# Patient Record
Sex: MALE | Race: WHITE | NOT HISPANIC OR LATINO | Employment: FULL TIME | URBAN - METROPOLITAN AREA
[De-identification: names, ages, dates, MRNs, and addresses within clinical notes are randomized per-mention and may not be internally consistent; named-entity substitution may affect disease eponyms.]

---

## 2020-12-31 ENCOUNTER — NURSE TRIAGE (OUTPATIENT)
Dept: NURSING | Facility: CLINIC | Age: 40
End: 2020-12-31

## 2020-12-31 ENCOUNTER — VIRTUAL VISIT (OUTPATIENT)
Dept: URGENT CARE | Facility: CLINIC | Age: 40
End: 2020-12-31
Payer: COMMERCIAL

## 2020-12-31 DIAGNOSIS — K04.7 DENTAL INFECTION: Primary | ICD-10-CM

## 2020-12-31 PROCEDURE — 99203 OFFICE O/P NEW LOW 30 MIN: CPT | Mod: TEL | Performed by: FAMILY MEDICINE

## 2020-12-31 RX ORDER — CLINDAMYCIN HCL 300 MG
300 CAPSULE ORAL 3 TIMES DAILY
Qty: 21 CAPSULE | Refills: 0 | Status: SHIPPED | OUTPATIENT
Start: 2020-12-31 | End: 2021-01-07

## 2020-12-31 NOTE — TELEPHONE ENCOUNTER
Triage Call:    Patient is requesting a virtual appointment.   Has a tooth that is broken, believe she may have an abscess. Gums towards the roof of mouth on left side there is a inflamed area.   Rates pain as moderate.   Denies any pus.   Tooth has been broken for several months.     Pt was advised of protocol recommendation/disposition of call dentist today.  Patient would like to speak to a provider about possibly getting antibiotics to start over the weekend.  RN discussed in person visit.   Patient declined going into the clinic, does not want to go in, requests only a phone visit to discuss.  Knows others who have been very sick with COVID and is nervous to be seen in person.    Patient requests a phone visit to discuss. RN recommended phone urgent care appointment as patient declined in person.    RN recommended patient still contact the dentist as well. Pt was agreeable to plan and was transferred to scheduling to make appointment.     Kristi Rdz RN 12/31/20 10:03 AM  [facility] Nurse Advisor        COVID 19 Nurse Triage Plan/Patient Instructions    Please be aware that novel coronavirus (COVID-19) may be circulating in the community. If you develop symptoms such as fever, cough, or SOB or if you have concerns about the presence of another infection including coronavirus (COVID-19), please contact your health care provider or visit [email or web address].     Disposition/Instructions    In-Person Visit with provider recommended. Reference Visit Selection Guide.    Thank you for taking steps to prevent the spread of this virus.  o Limit your contact with others.  o Wear a simple mask to cover your cough.  o Wash your hands well and often.    Resources    [facility]: About COVID-19: [email or web address]    CDC: What to Do If You're Sick: www.cdc.gov/coronavirus/2019-ncov/about/steps-when-sick.html    CDC: Ending Home Isolation:  www.cdc.gov/coronavirus/2019-ncov/hcp/disposition-in-home-patients.html     CDC: Caring for Someone: www.cdc.gov/coronavirus/2019-ncov/if-you-are-sick/care-for-someone.html     [facility]: Interim Guidance for Hospital Discharge to Home: www.health.[facility].[place].us/diseases/coronavirus/hcp/hospdischarge.pdf    [facility] clinical trials (COVID-19 research studies): clinicalaffairs.[facility]/umn-clinical-trials     Below are the COVID-19 hotlines at the [place] Department of Health ([facility]). Interpreters are available.   o For health questions: Call 987-172-2854 or 1-344.159.1248 (7 a.m. to 7 p.m.)  o For questions about schools and childcare: Call 215-593-5159 or 1-775.968.5863 (7 a.m. to 7 p.m.)                   Additional Information    Negative: Pale cold skin and very weak (can't stand)    Negative: Similar pain previously and it was from 'heart attack'    Negative: Similar pain previously and it was from 'angina' and not relieved by nitroglycerin    Negative: Sounds like a life-threatening emergency to the triager    Negative: Chest pain    Toothache followed tooth injury    Negative: Knocked out (unconscious) > 1 minute    Negative: Difficult to awaken or acting confused (e.g., disoriented, slurred speech)    Negative: SEVERE neck pain (e.g., excruciating)    Negative: Sounds like a life-threatening emergency to the triager    Chipped tooth (piece missing)    Negative: Wound looks infected    Negative: Tooth knocked out    Negative: Tooth is almost falling out    Negative: Bleeding won't stop after 10 minutes of direct pressure (using correct technique)    Negative: Sounds like a serious injury to the triager    Negative: SEVERE pain (e.g., excruciating)    Negative: Patient sounds very sick or weak to the triager    Negative: Face is swollen    Negative: Fever    Negative: SEVERE toothache pain    Toothache present > 24 hours    Red or yellow lump present at the gum line of the painful tooth    Protocols used:  TOOTHACHE-A-OH, TOOTH INJURY-A-OH

## 2020-12-31 NOTE — PROGRESS NOTES
"The patient has been notified of following:     \"This telephone or video visit will be conducted via a call between you and your physician/provider. We have found that certain health care needs can be provided without the need for a physical exam.  This service lets us provide the care you need with a short phone conversation.  If a prescription is necessary we can send it directly to your pharmacy.  If lab work is needed we can place an order for that and you can then stop by our lab to have the test done at a later time.    Telephone or video visits are billed at different rates depending on your insurance coverage. During this emergency period, for some insurers they may be billed the same as an in-person visit.  Please reach out to your insurance provider with any questions.    Patient has given verbal consent for Telephone or video visit?  Yes  Subjective   HPI    Has a broken tooth - patient thinks it may have had a root canal in the past   Patient thinks he might have an abscess  Has noticed a lump and pain in the root of this tooth -   Not bleeding  Top left molar   Swelling started yesterday morning  Swallowing ok    Per patient swelling is mostly mild and located at the gums   Denies facial swelling   No neck swelling     Patient Active Problem List   Diagnosis     CARDIOVASCULAR SCREENING; LDL GOAL LESS THAN 160     Past Surgical History:   Procedure Laterality Date     SURGICAL HISTORY OF -   1987    Rt Inguinal Hernia Repair       Social History     Tobacco Use     Smoking status: Current Every Day Smoker     Packs/day: 1.00     Types: Cigarettes     Smokeless tobacco: Never Used   Substance Use Topics     Alcohol use: Yes     Alcohol/week: 7.0 standard drinks     Types: 7 Standard drinks or equivalent per week     Family History   Problem Relation Age of Onset     Family History Negative Unknown            Reviewed and updated as needed this visit by Provider   Allergies  Meds              Review of " Systems   Constitutional, HEENT, cardiovascular, pulmonary, GI, , musculoskeletal, neuro, skin, endocrine and psych systems are negative, except as otherwise noted.       Objective   Reported vitals:  There were no vitals taken for this visit.   healthy, alert and no distress  PSYCH: Alert and oriented times 3; coherent speech, normal   rate and volume, able to articulate logical thoughts, able   to abstract reason, no tangential thoughts, no hallucinations   or delusions  His affect is normal  RESP: No cough, no audible wheezing, able to talk in full sentences  Additional exam:  none  Remainder of exam unable to be completed due to telephone visits    Diagnostic Test Results:  Labs reviewed in Epic  none         Assessment/Plan:      ICD-10-CM    1. Dental infection  K04.7 clindamycin (CLEOCIN) 300 MG capsule     Prescribed with clindamycin. Aware of risk of c.diff with clindamycin. Aware to stop antibiotic immediately and come in to be seen if develop any diarrheal reaction. Alternative therapies were also offered and discussed  Recommend dental evaluation as soon as possible - preferably within 3 days  Alarm signs or symptoms discussed, if present recommend go to ER   If swelling gets worse or swelling in the neck or under the eye, trismus, difficulty swallowing go to ER       call duration:  13 minutes  Video: no    Kayla Briceno MD

## 2022-05-28 ENCOUNTER — HOSPITAL ENCOUNTER (EMERGENCY)
Facility: CLINIC | Age: 42
Discharge: HOME | End: 2022-05-28
Attending: EMERGENCY MEDICINE | Admitting: EMERGENCY MEDICINE
Payer: COMMERCIAL

## 2022-05-28 VITALS
HEART RATE: 104 BPM | HEIGHT: 74 IN | DIASTOLIC BLOOD PRESSURE: 86 MMHG | SYSTOLIC BLOOD PRESSURE: 146 MMHG | WEIGHT: 150 LBS | BODY MASS INDEX: 19.25 KG/M2 | TEMPERATURE: 98.5 F | RESPIRATION RATE: 14 BRPM | OXYGEN SATURATION: 96 %

## 2022-05-28 DIAGNOSIS — R17 ELEVATED BILIRUBIN: ICD-10-CM

## 2022-05-28 DIAGNOSIS — F10.20 ALCOHOLISM (H): ICD-10-CM

## 2022-05-28 LAB
ALBUMIN SERPL-MCNC: 4.1 G/DL (ref 3.4–5)
ALP SERPL-CCNC: 85 U/L (ref 40–150)
ALT SERPL W P-5'-P-CCNC: 66 U/L (ref 0–70)
ANION GAP SERPL CALCULATED.3IONS-SCNC: 11 MMOL/L (ref 3–14)
AST SERPL W P-5'-P-CCNC: 124 U/L (ref 0–45)
BASOPHILS # BLD AUTO: 0.1 10E3/UL (ref 0–0.2)
BASOPHILS NFR BLD AUTO: 1 %
BILIRUB SERPL-MCNC: 4.8 MG/DL (ref 0.2–1.3)
BUN SERPL-MCNC: 10 MG/DL (ref 7–30)
CALCIUM SERPL-MCNC: 9.3 MG/DL (ref 8.5–10.1)
CHLORIDE BLD-SCNC: 97 MMOL/L (ref 94–109)
CO2 SERPL-SCNC: 26 MMOL/L (ref 20–32)
CREAT SERPL-MCNC: 0.96 MG/DL (ref 0.66–1.25)
EOSINOPHIL # BLD AUTO: 0.1 10E3/UL (ref 0–0.7)
EOSINOPHIL NFR BLD AUTO: 1 %
ERYTHROCYTE [DISTWIDTH] IN BLOOD BY AUTOMATED COUNT: 11.9 % (ref 10–15)
ETHANOL SERPL-MCNC: 0.1 G/DL
GFR SERPL CREATININE-BSD FRML MDRD: >90 ML/MIN/1.73M2
GLUCOSE BLD-MCNC: 107 MG/DL (ref 70–99)
HCT VFR BLD AUTO: 47 % (ref 40–53)
HGB BLD-MCNC: 16.6 G/DL (ref 13.3–17.7)
HOLD SPECIMEN: NORMAL
IMM GRANULOCYTES # BLD: 0 10E3/UL
IMM GRANULOCYTES NFR BLD: 0 %
LYMPHOCYTES # BLD AUTO: 1 10E3/UL (ref 0.8–5.3)
LYMPHOCYTES NFR BLD AUTO: 16 %
MCH RBC QN AUTO: 36 PG (ref 26.5–33)
MCHC RBC AUTO-ENTMCNC: 35.3 G/DL (ref 31.5–36.5)
MCV RBC AUTO: 102 FL (ref 78–100)
MONOCYTES # BLD AUTO: 0.7 10E3/UL (ref 0–1.3)
MONOCYTES NFR BLD AUTO: 12 %
NEUTROPHILS # BLD AUTO: 4.3 10E3/UL (ref 1.6–8.3)
NEUTROPHILS NFR BLD AUTO: 70 %
NRBC # BLD AUTO: 0 10E3/UL
NRBC BLD AUTO-RTO: 0 /100
PLATELET # BLD AUTO: 228 10E3/UL (ref 150–450)
POTASSIUM BLD-SCNC: 3.6 MMOL/L (ref 3.4–5.3)
PROT SERPL-MCNC: 7.4 G/DL (ref 6.8–8.8)
RBC # BLD AUTO: 4.61 10E6/UL (ref 4.4–5.9)
SODIUM SERPL-SCNC: 134 MMOL/L (ref 133–144)
WBC # BLD AUTO: 6.1 10E3/UL (ref 4–11)

## 2022-05-28 PROCEDURE — 96374 THER/PROPH/DIAG INJ IV PUSH: CPT

## 2022-05-28 PROCEDURE — 36415 COLL VENOUS BLD VENIPUNCTURE: CPT | Performed by: EMERGENCY MEDICINE

## 2022-05-28 PROCEDURE — 80053 COMPREHEN METABOLIC PANEL: CPT | Performed by: EMERGENCY MEDICINE

## 2022-05-28 PROCEDURE — 250N000013 HC RX MED GY IP 250 OP 250 PS 637: Performed by: EMERGENCY MEDICINE

## 2022-05-28 PROCEDURE — 96361 HYDRATE IV INFUSION ADD-ON: CPT

## 2022-05-28 PROCEDURE — 99285 EMERGENCY DEPT VISIT HI MDM: CPT | Mod: 25

## 2022-05-28 PROCEDURE — 82077 ASSAY SPEC XCP UR&BREATH IA: CPT | Performed by: EMERGENCY MEDICINE

## 2022-05-28 PROCEDURE — 85025 COMPLETE CBC W/AUTO DIFF WBC: CPT | Performed by: EMERGENCY MEDICINE

## 2022-05-28 PROCEDURE — 250N000011 HC RX IP 250 OP 636: Performed by: EMERGENCY MEDICINE

## 2022-05-28 PROCEDURE — 258N000003 HC RX IP 258 OP 636: Performed by: EMERGENCY MEDICINE

## 2022-05-28 RX ORDER — DIAZEPAM 10 MG/2ML
10 INJECTION, SOLUTION INTRAMUSCULAR; INTRAVENOUS ONCE
Status: COMPLETED | OUTPATIENT
Start: 2022-05-28 | End: 2022-05-28

## 2022-05-28 RX ORDER — DIAZEPAM 5 MG
10 TABLET ORAL ONCE
Status: COMPLETED | OUTPATIENT
Start: 2022-05-28 | End: 2022-05-28

## 2022-05-28 RX ADMIN — DIAZEPAM 10 MG: 5 INJECTION, SOLUTION INTRAMUSCULAR; INTRAVENOUS at 13:57

## 2022-05-28 RX ADMIN — DIAZEPAM 10 MG: 5 TABLET ORAL at 16:03

## 2022-05-28 RX ADMIN — SODIUM CHLORIDE 1000 ML: 9 INJECTION, SOLUTION INTRAVENOUS at 13:57

## 2022-05-28 RX ADMIN — SODIUM CHLORIDE 1000 ML: 9 INJECTION, SOLUTION INTRAVENOUS at 15:02

## 2022-05-28 NOTE — ED NOTES
Approximately 20 minute discussion with mom and patient about patient declining detox help. Mother is aware that she could decline to take patient home but she decided to take him and noted that patients father has struggled with alcoholism. Mother believes that if she only knew he was struggling that she could have prevented this situation from happening.     Patient requested an over-the-counter anti-anxiety medication. Advised patient that he could seek the help of a mental health practitioner for medications.    RN advised patient to go to detox. Patient states that he wants to stay with his mom so that he can go to his job on Tuesday. States that if he doesn't show up, he will be fired.

## 2022-05-28 NOTE — ED PROVIDER NOTES
"  History   Chief Complaint:  \"I've been on a ruvalcaba\"     The history is provided by the patient.   History supplemented by electronic chart review and mother at bedside    Jonh Ritter is a 41 year old male who presents with his mother for alcohol problems. The patient provides that he usually drinks 0.5 L of alcohol a day, and he has for a long time, and he has been binging even more for the last 4 days. His last drink was at 0700 today and he has had slight  tremors, nausea, and nonbloody vomiting. He comes to the ED for help with his alcohol use and possibly seeking out detox. He denies any history of alcohol withdrawal seizures and he has not sought treatment before.  No new pain.  No diarrhea.  No fevers.  No trouble breathing.  He has never gone through treatment or detox before.    Review of Systems   All other systems reviewed and are negative.    Allergies:   Penicillins     Medications:  The patient denies any current medications     Past Medical History:     Psoriasis     Past Surgical History:    Right Inguinal Hernia Repair     Family History:    The patient denies past family history.     Social History:  Patient came from home.  Patient is accompanied in the ED by his mother.  Patient drinks 0.5 liters of alcohol per day.  Works at an [employer].    Physical Exam     Patient Vitals for the past 24 hrs:   BP Temp Pulse Resp SpO2 Height Weight   05/28/22 1600 (!) 146/86 -- 104 14 96 % -- --   05/28/22 1447 -- -- 94 17 98 % -- --   05/28/22 1406 132/79 -- 102 20 96 % -- --   05/28/22 1313 (!) 145/86 98.5  F (36.9  C) (!) 130 20 98 % 1.88 m (6' 2\") 68 kg (150 lb)     Physical Exam  General: Male sitting upright in room 16, mother bedside  HENT: mucous membranes moist, OP clear  Eyes: PERRL without nystagmus  CV: extremities well perfused, regular rhythm  Resp: normal effort, no stridor, no cough observed  GI: abdomen soft and nontender, no guarding  MSK: no bony tenderness   Skin: appropriately " warm and dry  Neuro: alert, clear speech, oriented, normal tone in extremities, ambulation independent, slightly tremulous initially  Psych:  cooperative, denies feeling suicidal, no evidence of hallucinations    Emergency Department Course     Laboratory:  Labs Ordered and Resulted from Time of ED Arrival to Time of ED Departure   COMPREHENSIVE METABOLIC PANEL - Abnormal       Result Value    Sodium 134      Potassium 3.6      Chloride 97      Carbon Dioxide (CO2) 26      Anion Gap 11      Urea Nitrogen 10      Creatinine 0.96      Calcium 9.3      Glucose 107 (*)     Alkaline Phosphatase 85       (*)     ALT 66      Protein Total 7.4      Albumin 4.1      Bilirubin Total 4.8 (*)     GFR Estimate >90     ETHYL ALCOHOL LEVEL - Abnormal    Alcohol ethyl 0.10 (*)    CBC WITH PLATELETS AND DIFFERENTIAL - Abnormal    WBC Count 6.1      RBC Count 4.61      Hemoglobin 16.6      Hematocrit 47.0       (*)     MCH 36.0 (*)     MCHC 35.3      RDW 11.9      Platelet Count 228      % Neutrophils 70      % Lymphocytes 16      % Monocytes 12      % Eosinophils 1      % Basophils 1      % Immature Granulocytes 0      NRBCs per 100 WBC 0      Absolute Neutrophils 4.3      Absolute Lymphocytes 1.0      Absolute Monocytes 0.7      Absolute Eosinophils 0.1      Absolute Basophils 0.1      Absolute Immature Granulocytes 0.0      Absolute NRBCs 0.0        Emergency Department Course:     Reviewed:  I reviewed nursing notes, vitals, past medical history and Care Everywhere    Assessments:  1324 I obtained history and examined the patient as noted above.   1424 I rechecked the patient and explained findings.   1532 The patients nurse noted they would like to go to detox.    Interventions:  1357 NS 1000 mL IV  1357 Valium 10 mg IV  1502 NS 1000 mL IV  Valium 10mg PO    Disposition:  The patient was discharged    Impression & Plan   Medical Decision Making:  He presents for evaluation and treatment of his excess alcohol use.   He had initial slight tremors and tachycardia suggestive of early developing withdrawal and was given benzodiazepines with significant improvement.  I had several extended discussions with the patient and his mother at bedside regarding the risks of his alcoholism and treatment options, urging him to seek detox treatment given risk of worsening alcohol withdrawal, seizures, potentially even death.  At one point he did agree to go to detox, the bed was secured for him at [address] [place] and EMS was called, though then he changed his mind again and wished to go home instead.  Tachycardia resolved.  He does not manifest any altered mental status at this time, and is certainly not intoxicated despite the detectable alcohol level.  He was given printed resources to facilitate pursuit of alcohol treatment programs at a later time, and he is encouraged to return here for reevaluation at any hour.  He has elevated bilirubin but no abdominal pain or tenderness to suggest an acutely serious hepatobiliary disease.  He would benefit from alcohol cessation and close primary care follow-up as well.  Discharged home in improved condition.    Diagnosis:    ICD-10-CM    1. Alcoholism (H)  F10.20    2. Elevated bilirubin  R17        Scribe Disclosure:  I, Geovany Bowers, am serving as a scribe at 1:45 PM on 5/28/2022 to document services personally performed by Tomas Botello MD based on my observations and the provider's statements to me.     Scribe Disclosure:  I, Kash Arriaza, am serving as a scribe at 2:26 PM on 5/28/2022 to document services personally performed by Tomas Botello MD based on my observations and the provider's statements to me.             Tomas Botello MD  05/28/22 8712

## 2022-05-28 NOTE — ED TRIAGE NOTES
Pt drinking heaving for the past 4 days. Last drink at 7am today. Pt alert and oriented. tremors     Triage Assessment     Row Name 05/28/22 1314       Triage Assessment (Adult)    Airway WDL WDL       Respiratory WDL    Respiratory WDL WDL       Skin Circulation/Temperature WDL    Skin Circulation/Temperature WDL WDL       Cardiac WDL    Cardiac WDL WDL

## 2022-05-28 NOTE — ED NOTES
Pt resting on bed. States he feeling better from IV fluids and valium. Pt given lotion for psoriasis.

## 2022-05-28 NOTE — ED NOTES
Patient's mother came out to staff and said that patient has decided to go home instead of detox. RN spent time discussing this choice and potential outcome with patient and his mother.

## 2022-05-29 PROBLEM — F10.10 ALCOHOL ABUSE: Status: ACTIVE | Noted: 2022-05-29

## 2022-06-01 ENCOUNTER — VIRTUAL VISIT (OUTPATIENT)
Dept: INTERNAL MEDICINE | Facility: CLINIC | Age: 42
End: 2022-06-01
Payer: COMMERCIAL

## 2022-06-01 DIAGNOSIS — L40.9 PSORIASIS: ICD-10-CM

## 2022-06-01 DIAGNOSIS — R19.7 DIARRHEA, UNSPECIFIED TYPE: ICD-10-CM

## 2022-06-01 DIAGNOSIS — F10.10 ALCOHOL ABUSE: Primary | ICD-10-CM

## 2022-06-01 PROCEDURE — 99213 OFFICE O/P EST LOW 20 MIN: CPT | Mod: TEL | Performed by: INTERNAL MEDICINE

## 2022-06-01 RX ORDER — TRIAMCINOLONE ACETONIDE 1 MG/G
CREAM TOPICAL
Qty: 240 G | Refills: 3 | Status: SHIPPED | OUTPATIENT
Start: 2022-06-01 | End: 2024-04-12

## 2022-06-01 NOTE — Clinical Note
Please call to schedule face-to-face visit with clinician, could be internal medicine or family medicine, physician or nurse practitioner.  I signed a letter for work which is in my outbox, ask him how he wants that sent to him, for example scanned and emailed

## 2022-06-01 NOTE — PROGRESS NOTES
Jonh is a 41 year old who is being evaluated via a billable telephone visit.      What phone number would you like to be contacted at?  [phone]  How would you like to obtain your AVS? Mail a copy      Subjective   Jonh is a 41 year old who presents for the following health issues    Telephone visit because of alcohol use disorder, and following up after emergency department visit May 28, 2022    Last week alcohol ruvalcaba to point of blacking out and having basically no memory of several days  Friday May 27 he sent a messages to coworker and his mom (he does not remember actually sending)  Police came to his front door  We went to his Mom's house to detox  Had withdrawal and anxiety    [facility]-- diazepam and IV fluids  5-   Alcohol ethyl <=0.01 g/dL 0.10 High       AST 0 - 45 U/L 124 High      He opted out of detox.    He works as  of a   We missed work yesterday May 31  Went back to work today Jun 1, 2022    Today was first day, he did not have an eye-opener    Having diarrhea, probably as a withdrawal phenomenon  For diarrhea, recommended that he use over-the-counter Imodium, and also eat a bland diet, small more frequent meals.  Avoid caffeine and lactose.  The diarrhea is quite distressing, and he thinks that we will keep him from working this week    I wrote him a letter for work, recommending that he not work the remainder of this week, but could return on Monday, June 6    Alcoholism he admits since age 35  He is very afraid of losing his job  A friend will be taking him to an AA meeting, this weekend    Anxiety disorder, which probably drives alcohol problem  He asked about getting antianxiety medication, but I went to have a face-to-face visit with clinician first  We will have the scheduling team work on this    Psoriasis, he requests renewal of his topical triamcinolone    Phone call duration: 13 minutes

## 2022-06-02 ENCOUNTER — TELEPHONE (OUTPATIENT)
Dept: INTERNAL MEDICINE | Facility: CLINIC | Age: 42
End: 2022-06-02
Payer: COMMERCIAL

## 2022-06-02 NOTE — TELEPHONE ENCOUNTER
Pt called back and stated he has still not received the note that Dr Garcia wrote for him yesterday. Pt would like the letter sent via email @ [email or web address] or via [employer]. PT created a [employer] account today and said the letter could be sent via his [employer] account.    Alena Mejia

## 2022-06-02 NOTE — TELEPHONE ENCOUNTER
Received signed work excuse letter from Dr Garcia's outbox. Contacted pt to see how he wanted to receive the note. Pt requested the letter be emailed to him, so that was done- verified address and sent to email address on file: [email or web address]    MW 6/2/22

## 2022-06-11 ENCOUNTER — HEALTH MAINTENANCE LETTER (OUTPATIENT)
Age: 42
End: 2022-06-11

## 2022-09-03 PROCEDURE — 96361 HYDRATE IV INFUSION ADD-ON: CPT

## 2022-09-03 PROCEDURE — 96375 TX/PRO/DX INJ NEW DRUG ADDON: CPT

## 2022-09-03 PROCEDURE — 96376 TX/PRO/DX INJ SAME DRUG ADON: CPT

## 2022-09-03 PROCEDURE — 96374 THER/PROPH/DIAG INJ IV PUSH: CPT

## 2022-09-03 PROCEDURE — 99285 EMERGENCY DEPT VISIT HI MDM: CPT | Mod: 25

## 2022-09-04 ENCOUNTER — HOSPITAL ENCOUNTER (EMERGENCY)
Facility: CLINIC | Age: 42
Discharge: HOME | End: 2022-09-04
Attending: EMERGENCY MEDICINE | Admitting: EMERGENCY MEDICINE
Payer: COMMERCIAL

## 2022-09-04 VITALS
OXYGEN SATURATION: 96 % | BODY MASS INDEX: 19.88 KG/M2 | HEART RATE: 110 BPM | DIASTOLIC BLOOD PRESSURE: 59 MMHG | RESPIRATION RATE: 18 BRPM | SYSTOLIC BLOOD PRESSURE: 101 MMHG | WEIGHT: 150 LBS | TEMPERATURE: 97.5 F | HEIGHT: 73 IN

## 2022-09-04 DIAGNOSIS — F10.930 ALCOHOL WITHDRAWAL SYNDROME WITHOUT COMPLICATION (H): ICD-10-CM

## 2022-09-04 LAB
ALBUMIN SERPL-MCNC: 4.5 G/DL (ref 3.4–5)
ALP SERPL-CCNC: 87 U/L (ref 40–150)
ALT SERPL W P-5'-P-CCNC: 34 U/L (ref 0–70)
ANION GAP SERPL CALCULATED.3IONS-SCNC: 10 MMOL/L (ref 3–14)
AST SERPL W P-5'-P-CCNC: 33 U/L (ref 0–45)
BASOPHILS # BLD AUTO: 0.1 10E3/UL (ref 0–0.2)
BASOPHILS NFR BLD AUTO: 0 %
BILIRUB SERPL-MCNC: 2.4 MG/DL (ref 0.2–1.3)
BUN SERPL-MCNC: 8 MG/DL (ref 7–30)
CALCIUM SERPL-MCNC: 9.3 MG/DL (ref 8.5–10.1)
CHLORIDE BLD-SCNC: 102 MMOL/L (ref 94–109)
CO2 SERPL-SCNC: 28 MMOL/L (ref 20–32)
CREAT SERPL-MCNC: 0.92 MG/DL (ref 0.66–1.25)
EOSINOPHIL # BLD AUTO: 0 10E3/UL (ref 0–0.7)
EOSINOPHIL NFR BLD AUTO: 0 %
ERYTHROCYTE [DISTWIDTH] IN BLOOD BY AUTOMATED COUNT: 12.5 % (ref 10–15)
ETHANOL SERPL-MCNC: 0.13 G/DL
GFR SERPL CREATININE-BSD FRML MDRD: >90 ML/MIN/1.73M2
GLUCOSE BLD-MCNC: 109 MG/DL (ref 70–99)
HCT VFR BLD AUTO: 51.3 % (ref 40–53)
HGB BLD-MCNC: 18.1 G/DL (ref 13.3–17.7)
IMM GRANULOCYTES # BLD: 0 10E3/UL
IMM GRANULOCYTES NFR BLD: 0 %
LIPASE SERPL-CCNC: 95 U/L (ref 73–393)
LYMPHOCYTES # BLD AUTO: 2 10E3/UL (ref 0.8–5.3)
LYMPHOCYTES NFR BLD AUTO: 18 %
MAGNESIUM SERPL-MCNC: 2 MG/DL (ref 1.6–2.3)
MCH RBC QN AUTO: 35.1 PG (ref 26.5–33)
MCHC RBC AUTO-ENTMCNC: 35.3 G/DL (ref 31.5–36.5)
MCV RBC AUTO: 99 FL (ref 78–100)
MONOCYTES # BLD AUTO: 0.9 10E3/UL (ref 0–1.3)
MONOCYTES NFR BLD AUTO: 8 %
NEUTROPHILS # BLD AUTO: 8.3 10E3/UL (ref 1.6–8.3)
NEUTROPHILS NFR BLD AUTO: 74 %
NRBC # BLD AUTO: 0 10E3/UL
NRBC BLD AUTO-RTO: 0 /100
PLATELET # BLD AUTO: 295 10E3/UL (ref 150–450)
POTASSIUM BLD-SCNC: 3.6 MMOL/L (ref 3.4–5.3)
PROT SERPL-MCNC: 8.1 G/DL (ref 6.8–8.8)
RBC # BLD AUTO: 5.16 10E6/UL (ref 4.4–5.9)
SODIUM SERPL-SCNC: 140 MMOL/L (ref 133–144)
WBC # BLD AUTO: 11.4 10E3/UL (ref 4–11)

## 2022-09-04 PROCEDURE — 36415 COLL VENOUS BLD VENIPUNCTURE: CPT | Performed by: EMERGENCY MEDICINE

## 2022-09-04 PROCEDURE — 258N000003 HC RX IP 258 OP 636: Performed by: EMERGENCY MEDICINE

## 2022-09-04 PROCEDURE — 80053 COMPREHEN METABOLIC PANEL: CPT | Performed by: EMERGENCY MEDICINE

## 2022-09-04 PROCEDURE — 82077 ASSAY SPEC XCP UR&BREATH IA: CPT | Performed by: EMERGENCY MEDICINE

## 2022-09-04 PROCEDURE — 83690 ASSAY OF LIPASE: CPT | Performed by: EMERGENCY MEDICINE

## 2022-09-04 PROCEDURE — 85025 COMPLETE CBC W/AUTO DIFF WBC: CPT | Performed by: EMERGENCY MEDICINE

## 2022-09-04 PROCEDURE — 250N000013 HC RX MED GY IP 250 OP 250 PS 637: Performed by: EMERGENCY MEDICINE

## 2022-09-04 PROCEDURE — 250N000011 HC RX IP 250 OP 636: Performed by: EMERGENCY MEDICINE

## 2022-09-04 PROCEDURE — 83735 ASSAY OF MAGNESIUM: CPT | Performed by: EMERGENCY MEDICINE

## 2022-09-04 RX ORDER — CHLORDIAZEPOXIDE HYDROCHLORIDE 25 MG/1
25-50 CAPSULE, GELATIN COATED ORAL 3 TIMES DAILY PRN
Qty: 15 CAPSULE | Refills: 0 | Status: SHIPPED | OUTPATIENT
Start: 2022-09-04 | End: 2022-12-30

## 2022-09-04 RX ORDER — SODIUM CHLORIDE 9 MG/ML
INJECTION, SOLUTION INTRAVENOUS CONTINUOUS
Status: DISCONTINUED | OUTPATIENT
Start: 2022-09-04 | End: 2022-09-04 | Stop reason: HOSPADM

## 2022-09-04 RX ORDER — DIAZEPAM 10 MG/2ML
5 INJECTION, SOLUTION INTRAMUSCULAR; INTRAVENOUS ONCE
Status: COMPLETED | OUTPATIENT
Start: 2022-09-04 | End: 2022-09-04

## 2022-09-04 RX ORDER — ONDANSETRON 2 MG/ML
4 INJECTION INTRAMUSCULAR; INTRAVENOUS EVERY 30 MIN PRN
Status: DISCONTINUED | OUTPATIENT
Start: 2022-09-04 | End: 2022-09-04 | Stop reason: HOSPADM

## 2022-09-04 RX ORDER — ONDANSETRON 4 MG/1
4 TABLET, ORALLY DISINTEGRATING ORAL EVERY 8 HOURS PRN
Qty: 10 TABLET | Refills: 0 | Status: SHIPPED | OUTPATIENT
Start: 2022-09-04 | End: 2022-09-07

## 2022-09-04 RX ORDER — CHLORDIAZEPOXIDE HYDROCHLORIDE 25 MG/1
25 CAPSULE, GELATIN COATED ORAL ONCE
Status: DISCONTINUED | OUTPATIENT
Start: 2022-09-04 | End: 2022-09-04

## 2022-09-04 RX ORDER — DIAZEPAM 5 MG
5 TABLET ORAL ONCE
Status: COMPLETED | OUTPATIENT
Start: 2022-09-04 | End: 2022-09-04

## 2022-09-04 RX ADMIN — SODIUM CHLORIDE: 9 INJECTION, SOLUTION INTRAVENOUS at 06:28

## 2022-09-04 RX ADMIN — SODIUM CHLORIDE 1000 ML: 9 INJECTION, SOLUTION INTRAVENOUS at 04:41

## 2022-09-04 RX ADMIN — DIAZEPAM 5 MG: 5 INJECTION, SOLUTION INTRAMUSCULAR; INTRAVENOUS at 04:47

## 2022-09-04 RX ADMIN — DIAZEPAM 5 MG: 5 INJECTION, SOLUTION INTRAMUSCULAR; INTRAVENOUS at 06:27

## 2022-09-04 RX ADMIN — FAMOTIDINE 20 MG: 10 INJECTION INTRAVENOUS at 04:44

## 2022-09-04 RX ADMIN — ONDANSETRON 4 MG: 2 INJECTION INTRAMUSCULAR; INTRAVENOUS at 03:00

## 2022-09-04 RX ADMIN — DIAZEPAM 5 MG: 5 TABLET ORAL at 08:34

## 2022-09-04 RX ADMIN — SODIUM CHLORIDE 1000 ML: 9 INJECTION, SOLUTION INTRAVENOUS at 03:00

## 2022-09-04 ASSESSMENT — ENCOUNTER SYMPTOMS
VOMITING: 0
ABDOMINAL PAIN: 1
NAUSEA: 1
NERVOUS/ANXIOUS: 1
ABDOMINAL DISTENTION: 1
APPETITE CHANGE: 1

## 2022-09-04 ASSESSMENT — ACTIVITIES OF DAILY LIVING (ADL)
ADLS_ACUITY_SCORE: 35

## 2022-09-04 NOTE — ED PROVIDER NOTES
History   Chief Complaint:  Alcohol Problem and Withdrawal    The history is provided by the patient and a friend.      Jonh Ritter is a 41 year old male with history of alcohol abuse who presents with withdrawal symptoms. Jonh explains that he had his drinking under control for a while following his last episode of alcohol withdrawal three months ago. He adds that he recently had a ten day break from work and that he drank the first night thinking he'd have the rest of his break to get over his hangover. He reports that he had to continue drinking to avoid the hangover. He additionally reports nausea, decreased appetite, abdominal pain and distension, anxiety, and generalized tension in his body. Denies emesis. Jonh's friend, Alena, mentions that Jonh hasn't eaten in three days. He explains that he has excuses to drink alcohol such as alcohol being a sleep aid for him. Jonh shares that he is interested in inpatient treatment but is concerned that he would be fired from his job which he really enjoys. He reports that he has never been to detox nor has he considered outpatient treatment.     Review of Systems   Constitutional: Positive for appetite change.   Gastrointestinal: Positive for abdominal distention, abdominal pain and nausea. Negative for vomiting.   Psychiatric/Behavioral: The patient is nervous/anxious.    All other systems reviewed and are negative.    Allergies:   Penicillins      Medications:  Antacids     Past Medical History:     Psoriasis   Alcohol abuse      Past Surgical History:    Right Inguinal Hernia Repair      Family History:    The patient denies past family history.      Social History:  Patient came from home.  Lives alone.   Patient is accompanied in the ED by his friend, Alena.  Alcohol use: last drink tonight. Drinks frequently. Second episode of withdrawal.   PCP: Reid Garcia MD     Physical Exam     Patient Vitals for the past 24 hrs:   BP Temp Temp src Pulse Resp SpO2  "Height Weight   09/04/22 0500 116/78 -- -- 101 -- 96 % -- --   09/03/22 2218 (!) 167/90 97.5  F (36.4  C) Temporal 110 18 98 % 1.854 m (6' 1\") 68 kg (150 lb)     Physical Exam  General: Sitting up in bed  Eyes:  The pupils are equal and round    Conjunctivae and sclerae are normal  ENT:    Wearing a mask  Neck:  Normal range of motion  CV:  Tachycardic rate, regular rhythm     Skin warm and well perfused   Resp:  Non labored breathing on room air    No tachypnea    No cough heard    Lungs clear bilaterally  GI:  Abdomen is soft, there is no rigidity    No distension    No rebound tenderness     No abdominal tenderness  MS:  Bilateral hand tremors  Skin:  No rash or acute skin lesions noted  Neuro:   Awake, alert.      Speech is normal and fluent.    Face is symmetric.     Moves all extremities equally  Psych:  Anxiety    Emergency Department Course     Laboratory:  Labs Ordered and Resulted from Time of ED Arrival to Time of ED Departure   COMPREHENSIVE METABOLIC PANEL - Abnormal       Result Value    Sodium 140      Potassium 3.6      Chloride 102      Carbon Dioxide (CO2) 28      Anion Gap 10      Urea Nitrogen 8      Creatinine 0.92      Calcium 9.3      Glucose 109 (*)     Alkaline Phosphatase 87      AST 33      ALT 34      Protein Total 8.1      Albumin 4.5      Bilirubin Total 2.4 (*)     GFR Estimate >90     ETHYL ALCOHOL LEVEL - Abnormal    Alcohol ethyl 0.13 (*)    CBC WITH PLATELETS AND DIFFERENTIAL - Abnormal    WBC Count 11.4 (*)     RBC Count 5.16      Hemoglobin 18.1 (*)     Hematocrit 51.3      MCV 99      MCH 35.1 (*)     MCHC 35.3      RDW 12.5      Platelet Count 295      % Neutrophils 74      % Lymphocytes 18      % Monocytes 8      % Eosinophils 0      % Basophils 0      % Immature Granulocytes 0      NRBCs per 100 WBC 0      Absolute Neutrophils 8.3      Absolute Lymphocytes 2.0      Absolute Monocytes 0.9      Absolute Eosinophils 0.0      Absolute Basophils 0.1      Absolute Immature " Granulocytes 0.0      Absolute NRBCs 0.0     MAGNESIUM - Normal    Magnesium 2.0     LIPASE - Normal    Lipase 95       Emergency Department Course:       Reviewed:  I reviewed nursing notes, vitals, past medical history and Care Everywhere    Assessments:  0338 I obtained history and examined the patient as noted above.   0510 I rechecked the patient and explained findings.     Interventions:  Medications   0.9% sodium chloride BOLUS (0 mLs Intravenous Stopped 9/4/22 0441)     Followed by   sodium chloride 0.9% infusion (has no administration in time range)   ondansetron (ZOFRAN) injection 4 mg (4 mg Intravenous Given 9/4/22 0300)   diazepam (VALIUM) injection 5 mg (has no administration in time range)   diazepam (VALIUM) injection 5 mg (5 mg Intravenous Given 9/4/22 0447)   famotidine (PEPCID) injection 20 mg (20 mg Intravenous Given 9/4/22 0444)   0.9% sodium chloride BOLUS (1,000 mLs Intravenous New Bag 9/4/22 0441)     Disposition:  The patient was discharged to home.     Impression & Plan     Medical Decision Making:  Jonh Ritter is a 41-year-old male who presented to the emergency department with alcohol withdrawal.  Patient has been drinking heavily.  Wants to stop drinking.  Is supposed to go to work on Tuesday and reports that he needs to go to work then.  He cannot do an inpatient treatment program as he is afraid he will lose his job.  He declines detox.  Laboratory studies are overall unremarkable except for an elevated bilirubin which is slightly improved from a few months ago.  Has no focal tenderness and doubt biliary stone.  Likely from his alcohol use.  He was given Valium and had some improvement.  I discussed options with him and he wanted to go home.  He continues to decline detox.  I offered oral medications in the emergency department which he declined.  He would like to go home with medication.  His friend Alena is with him and will make sure that he does not drink alcohol while he is  taking this medication.  I discussed that he should not drive or work while taking this medication.  He also may consider just weaning himself off alcohol at home to help with alcohol withdrawal symptoms.     Diagnosis:    ICD-10-CM    1. Alcohol withdrawal syndrome without complication (H)  F10.230      Scribe Disclosure:  I, Lacey Lee, am serving as a scribe at 2:38 AM on 9/4/2022 to document services personally performed by No Luke MD based on my observations and the provider's statements to me.      No Luke MD  09/04/22 0818

## 2022-09-04 NOTE — ED TRIAGE NOTES
41-year-old male presents to the ED with complaints of alcohol withdrawal. Pt states he has been drinking heavily for the last week. And for the last three days he hasn't been feeling well so he felt he was going through withdrawals. Pt has been drinking ETOH to try to stop the withdrawals. Pt states last drink about 1 hour ago.      Triage Assessment     Row Name 09/03/22 [id number]       Triage Assessment (Adult)    Airway WDL WDL       Respiratory WDL    Respiratory WDL WDL       Skin Circulation/Temperature WDL    Skin Circulation/Temperature WDL WDL       Cardiac WDL    Cardiac WDL WDL       Peripheral/Neurovascular WDL    Peripheral Neurovascular WDL WDL       Cognitive/Neuro/Behavioral WDL    Cognitive/Neuro/Behavioral WDL WDL

## 2022-09-04 NOTE — DISCHARGE INSTRUCTIONS
Take librium for withdrawal, anxiety  But no drinking while taking this medication  Zofran for nausea  Take an antacid like omeprazole or pepcid daily. Can also use tums as neeeded

## 2022-10-16 ENCOUNTER — HEALTH MAINTENANCE LETTER (OUTPATIENT)
Age: 42
End: 2022-10-16

## 2022-10-30 ENCOUNTER — E-VISIT (OUTPATIENT)
Dept: URGENT CARE | Facility: CLINIC | Age: 42
End: 2022-10-30
Payer: COMMERCIAL

## 2022-10-30 DIAGNOSIS — R06.02 SOB (SHORTNESS OF BREATH): Primary | ICD-10-CM

## 2022-10-30 PROCEDURE — 99207 PR NON-BILLABLE SERV PER CHARTING: CPT | Performed by: INTERNAL MEDICINE

## 2022-10-30 NOTE — PATIENT INSTRUCTIONS
Dear Jonh Ritter,    We are sorry you are not feeling well. Based on the responses you provided, it is recommended that you be seen in-person in urgent care so we can better evaluate your symptoms as your shortness of breath is concerning. Please click here to find the nearest urgent care location to you.   You will not be charged for this Visit. Thank you for trusting us with your care.    Nataly Nesbitt MD

## 2022-12-29 ENCOUNTER — HOSPITAL ENCOUNTER (EMERGENCY)
Facility: CLINIC | Age: 42
Discharge: HOME | End: 2022-12-29
Attending: EMERGENCY MEDICINE | Admitting: EMERGENCY MEDICINE
Payer: COMMERCIAL

## 2022-12-29 VITALS
BODY MASS INDEX: 19.89 KG/M2 | HEIGHT: 74 IN | OXYGEN SATURATION: 98 % | SYSTOLIC BLOOD PRESSURE: 127 MMHG | RESPIRATION RATE: 10 BRPM | HEART RATE: 100 BPM | DIASTOLIC BLOOD PRESSURE: 102 MMHG | WEIGHT: 155 LBS | TEMPERATURE: 98 F

## 2022-12-29 DIAGNOSIS — F10.929 ALCOHOLIC INTOXICATION WITH COMPLICATION (H): ICD-10-CM

## 2022-12-29 DIAGNOSIS — R45.851 SUICIDAL IDEATION: ICD-10-CM

## 2022-12-29 LAB
ALBUMIN SERPL-MCNC: 4.5 G/DL (ref 3.4–5)
ALP SERPL-CCNC: 91 U/L (ref 40–150)
ALT SERPL W P-5'-P-CCNC: 48 U/L (ref 0–70)
ANION GAP SERPL CALCULATED.3IONS-SCNC: 8 MMOL/L (ref 3–14)
AST SERPL W P-5'-P-CCNC: 66 U/L (ref 0–45)
BASOPHILS # BLD AUTO: 0.1 10E3/UL (ref 0–0.2)
BASOPHILS NFR BLD AUTO: 1 %
BILIRUB SERPL-MCNC: 1 MG/DL (ref 0.2–1.3)
BUN SERPL-MCNC: 6 MG/DL (ref 7–30)
CALCIUM SERPL-MCNC: 9 MG/DL (ref 8.5–10.1)
CHLORIDE BLD-SCNC: 101 MMOL/L (ref 94–109)
CO2 SERPL-SCNC: 28 MMOL/L (ref 20–32)
CREAT SERPL-MCNC: 0.86 MG/DL (ref 0.66–1.25)
EOSINOPHIL # BLD AUTO: 0.1 10E3/UL (ref 0–0.7)
EOSINOPHIL NFR BLD AUTO: 1 %
ERYTHROCYTE [DISTWIDTH] IN BLOOD BY AUTOMATED COUNT: 12.2 % (ref 10–15)
ETHANOL SERPL-MCNC: 0.33 G/DL
GFR SERPL CREATININE-BSD FRML MDRD: >90 ML/MIN/1.73M2
GLUCOSE BLD-MCNC: 134 MG/DL (ref 70–99)
HCT VFR BLD AUTO: 48.9 % (ref 40–53)
HGB BLD-MCNC: 17.6 G/DL (ref 13.3–17.7)
HOLD SPECIMEN: NORMAL
IMM GRANULOCYTES # BLD: 0 10E3/UL
IMM GRANULOCYTES NFR BLD: 0 %
LIPASE SERPL-CCNC: 113 U/L (ref 73–393)
LYMPHOCYTES # BLD AUTO: 2 10E3/UL (ref 0.8–5.3)
LYMPHOCYTES NFR BLD AUTO: 21 %
MCH RBC QN AUTO: 35.8 PG (ref 26.5–33)
MCHC RBC AUTO-ENTMCNC: 36 G/DL (ref 31.5–36.5)
MCV RBC AUTO: 99 FL (ref 78–100)
MONOCYTES # BLD AUTO: 0.9 10E3/UL (ref 0–1.3)
MONOCYTES NFR BLD AUTO: 10 %
NEUTROPHILS # BLD AUTO: 6.1 10E3/UL (ref 1.6–8.3)
NEUTROPHILS NFR BLD AUTO: 67 %
NRBC # BLD AUTO: 0 10E3/UL
NRBC BLD AUTO-RTO: 0 /100
PLATELET # BLD AUTO: 316 10E3/UL (ref 150–450)
POTASSIUM BLD-SCNC: 3.6 MMOL/L (ref 3.4–5.3)
PROT SERPL-MCNC: 8.6 G/DL (ref 6.8–8.8)
RBC # BLD AUTO: 4.92 10E6/UL (ref 4.4–5.9)
SARS-COV-2 RNA RESP QL NAA+PROBE: NEGATIVE
SODIUM SERPL-SCNC: 137 MMOL/L (ref 133–144)
WBC # BLD AUTO: 9.2 10E3/UL (ref 4–11)

## 2022-12-29 PROCEDURE — 250N000011 HC RX IP 250 OP 636: Performed by: EMERGENCY MEDICINE

## 2022-12-29 PROCEDURE — 96365 THER/PROPH/DIAG IV INF INIT: CPT

## 2022-12-29 PROCEDURE — 83690 ASSAY OF LIPASE: CPT | Performed by: EMERGENCY MEDICINE

## 2022-12-29 PROCEDURE — 96374 THER/PROPH/DIAG INJ IV PUSH: CPT

## 2022-12-29 PROCEDURE — 36415 COLL VENOUS BLD VENIPUNCTURE: CPT | Performed by: EMERGENCY MEDICINE

## 2022-12-29 PROCEDURE — 258N000003 HC RX IP 258 OP 636: Performed by: EMERGENCY MEDICINE

## 2022-12-29 PROCEDURE — 96361 HYDRATE IV INFUSION ADD-ON: CPT

## 2022-12-29 PROCEDURE — 80053 COMPREHEN METABOLIC PANEL: CPT | Performed by: EMERGENCY MEDICINE

## 2022-12-29 PROCEDURE — 99285 EMERGENCY DEPT VISIT HI MDM: CPT | Mod: 25

## 2022-12-29 PROCEDURE — 82077 ASSAY SPEC XCP UR&BREATH IA: CPT | Performed by: EMERGENCY MEDICINE

## 2022-12-29 PROCEDURE — 85014 HEMATOCRIT: CPT | Performed by: EMERGENCY MEDICINE

## 2022-12-29 PROCEDURE — C9803 HOPD COVID-19 SPEC COLLECT: HCPCS

## 2022-12-29 PROCEDURE — 90791 PSYCH DIAGNOSTIC EVALUATION: CPT

## 2022-12-29 PROCEDURE — U0003 INFECTIOUS AGENT DETECTION BY NUCLEIC ACID (DNA OR RNA); SEVERE ACUTE RESPIRATORY SYNDROME CORONAVIRUS 2 (SARS-COV-2) (CORONAVIRUS DISEASE [COVID-19]), AMPLIFIED PROBE TECHNIQUE, MAKING USE OF HIGH THROUGHPUT TECHNOLOGIES AS DESCRIBED BY CMS-2020-01-R: HCPCS | Performed by: EMERGENCY MEDICINE

## 2022-12-29 PROCEDURE — 250N000013 HC RX MED GY IP 250 OP 250 PS 637: Performed by: EMERGENCY MEDICINE

## 2022-12-29 RX ORDER — ONDANSETRON 2 MG/ML
4 INJECTION INTRAMUSCULAR; INTRAVENOUS ONCE
Status: COMPLETED | OUTPATIENT
Start: 2022-12-29 | End: 2022-12-29

## 2022-12-29 RX ORDER — LORAZEPAM 0.5 MG/1
1 TABLET ORAL ONCE
Status: COMPLETED | OUTPATIENT
Start: 2022-12-29 | End: 2022-12-29

## 2022-12-29 RX ADMIN — SODIUM CHLORIDE 1000 ML: 9 INJECTION, SOLUTION INTRAVENOUS at 05:12

## 2022-12-29 RX ADMIN — LORAZEPAM 1 MG: 0.5 TABLET ORAL at 06:39

## 2022-12-29 RX ADMIN — ONDANSETRON 4 MG: 2 INJECTION INTRAMUSCULAR; INTRAVENOUS at 03:49

## 2022-12-29 RX ADMIN — SODIUM CHLORIDE 1000 ML: 9 INJECTION, SOLUTION INTRAVENOUS at 03:49

## 2022-12-29 ASSESSMENT — COLUMBIA-SUICIDE SEVERITY RATING SCALE - C-SSRS
TOTAL  NUMBER OF INTERRUPTED ATTEMPTS LIFETIME: NO
2. HAVE YOU ACTUALLY HAD ANY THOUGHTS OF KILLING YOURSELF?: NO
1. IN THE PAST MONTH, HAVE YOU WISHED YOU WERE DEAD OR WISHED YOU COULD GO TO SLEEP AND NOT WAKE UP?: YES
REASONS FOR IDEATION PAST MONTH: MOSTLY TO GET ATTENTION, REVENGE, OR A REACTION FROM OTHERS
ATTEMPT LIFETIME: NO
REASONS FOR IDEATION LIFETIME: MOSTLY TO GET ATTENTION, REVENGE, OR A REACTION FROM OTHERS
TOTAL  NUMBER OF ABORTED OR SELF INTERRUPTED ATTEMPTS LIFETIME: NO
1. HAVE YOU WISHED YOU WERE DEAD OR WISHED YOU COULD GO TO SLEEP AND NOT WAKE UP?: YES
6. HAVE YOU EVER DONE ANYTHING, STARTED TO DO ANYTHING, OR PREPARED TO DO ANYTHING TO END YOUR LIFE?: NO

## 2022-12-29 ASSESSMENT — ACTIVITIES OF DAILY LIVING (ADL)
ADLS_ACUITY_SCORE: 35

## 2022-12-29 NOTE — ED PROVIDER NOTES
ED course:  Patient received as a handoff from my partner Dr. Botello.  On face-to-face evaluation patient reports no additional complaints.  Underwent DEC evaluation and felt appropriate for discharge and outpatient chemical dependency follow-up; I agree with this assessment.  Patient was offered but deferred transfer to detox.  At this time he is clinically sober and showing no significant signs of withdrawal.  Will be discharged with friend.  Return precautions discussed.    Impression:    ICD-10-CM    1. Alcoholic intoxication with complication (H)  F10.929       2. Suicidal ideation  R45.851             Disposition:  Discharge         Gutierrez Rogers,   12/29/22 0957

## 2022-12-29 NOTE — CONSULTS
"Diagnostic Evaluation Consultation  Crisis Assessment    Patient was assessed: In Person  Patient location: [facility] ED  Was a release of information signed: No. Reason: patient reports n established outpatient providers       Referral Data and Chief Complaint  Jonh is a 42 year old, who uses he/him pronouns, and presents to the ED with family/friends. Patient is referred to the ED by family/friends. Patient is presenting to the ED for the following concerns: alcohol use with intoxication, SI.      Informed Consent and Assessment Methods     Patient is his own guardian. Writer met with patient and explained the crisis assessment process, including applicable information disclosures and limits to confidentiality, assessed understanding of the process, and obtained consent to proceed with the assessment. Patient was observed to be able to participate in the assessment as evidenced by verbal agreement and participation . Assessment methods included conducting a formal interview with patient, review of medical records, collaboration with medical staff, and obtaining relevant collateral information from family and community providers when available.     Over the course of this crisis assessment provided reassurance, offered validation, engaged patient in problem solving and disposition planning, worked with patient on safety and aftercare planning, provided psychoeducation and facilitated family communication. Patient's response to interventions was appropriate, patient was cooperative.       Summary of Patient Situation     Patient presents to the ED for evaluation of alcohol use (intoxication and withdrawal) and SI.  Patient was brought in by his girlfriend who accompanies him in the ED.  Patient states \"I have a real problem with alcohol, this is the third time I've been here for this\".  Patient reports he drinks alcohol daily, he states he has been 10 years, he started when he was 32 yrs old after a break up.  " Patient said he typically drinks 10-12 shots of Jagermeister mixed with red bull each night.  Patient has a family history of alcoholism, his father was an alcoholic, collateral reports trauma based on relationship with father from childhood.  Patient denies SI currently, though collateral notes he has been making passive SI comments while intoxicated saying his son would be better off if he was dead.  Patient shares a 4 year old son with his girlfriend, they live separately and his girlfriend has primary custody.  Patient denies history of suicide attempts, denies NSSI, and denies HI.  Patient denies AH/VH, denies command hallucinations.  Patient presents with fatigue, he endorses some withdrawal symptoms, nausea and shakiness, denies history of DTs or seizures.  This is the third time patient has presented to the ED with alcohol concerns, he was evaluated with similar presentation on 5/28/22 and 9/3/22.      Brief Psychosocial History     Patient reports he lives alone, he has close supportive relationships with his mother, sister, and girlfriend Arabella.  Patient has a family history of alcoholism father.  Collateral notes patient has trauma from childhood due to relationship with his father and alcohol use.  Patient reports he is employed full-time as an .  He said his alcohol use has started to affect his employment negatively and is worried about losing his job.  He also is ambivalent about engaging in CD Treatment because he states he does not want to miss work.  Patient denies [place] status, and no significant cultural/Mormonism factors affecting care.  Patient denies current legal issues, he states he got a DUI in 2015 but has since completed all requirements and is not currently on probation.      Significant Clinical History     Patient reports a history of anxiety and alcohol use disorder.  Patient states he started drinking 10 years ago at age 32 after a relationship ended.  He states  "his father was an alcoholic and said \"now I'm turning out just like him\".  Patient reports he drinks daily, typically 10-12 shots of alcohol per night.  He endorses some withdrawal symptoms, though denies a history of DTs or seizures.  Patient is ambivalent regarding CD treatment, he has declined detox and not accessed CD treatment yet.  Patient does not have any established outpatient providers, no past psychiatric admissions or commitments.       Collateral Information  The following information was received from Arabella whose relationship to the patient is girlfriend. Information was obtained in person. They last had contact with patient today, accompanied the patient in the ED.    What happened today: Arabella reports the patient's mother and sister called her and informed her that the patient had been drinking since last Thursday after he got home from work.  The family has been concerned regarding the patient's drinking for some time now, they are frustrated that he is resistant to going to detox ad/or CD treatment.  Arabella said \"it took three of us to get him out of that house and here for help\".  Arabella said the patient has been making passive SI comments, saying his son would be better off if he was dead, Arabella states the patient has not mentioned having ideas of a plan or intent.  She said the family was concerned about his safety based on the comments and frequent intoxication, so she recently went to his home and removed all guns, gave them to his best friend to not have them available in the patient's home.      What is different about patient's functioning: increased alcohol usage, intoxication, SI comments    Concern about alcohol/drug use: Yes daily alcohol use, has been binging for about one week    What do you think the patient needs: detox and CD treatment     Has patient made comments about wanting to kill themselves/others:  Yes comments stating his son would be better off if he was dead    If d/c " is recommended, can they take part in safety/aftercare planning: Yes girlfriend is willing to assist the patient with safety/aftercare planning       Risk Assessment    Saluda Suicide Severity Rating Scale Full Clinical Version: 10/29/22  Suicidal Ideation  1. Wish to be Dead (Lifetime): Yes  1. Wish to be Dead (Past 1 Month): Yes  2. Non-Specific Active Suicidal Thoughts (Lifetime): No  Intensity of Ideation  Most Severe Ideation Rating (Lifetime): 2  Most Severe Ideation Rating (Past 1 Month): 2  Frequency (Lifetime): Less than once a week  Frequency (Past 1 Month): Less than once a week  Duration (Lifetime): Fleeting, few seconds or minutes  Duration (Past 1 Month): Fleeting, few seconds or minutes  Controllability (Lifetime): Can control thoughts with little difficulty  Controllability (Past 1 Month): Can control thoughts with little difficulty  Deterrents (Lifetime): Deterrents definitely stopped you from attempting suicide  Deterrents (Past 1 Month): Deterrents definitely stopped you from attempting suicide  Reasons for Ideation (Lifetime): Mostly to get attention, revenge, or a reaction from others  Reasons for Ideation (Past 1 Month): Mostly to get attention, revenge, or a reaction from others  Suicidal Behavior  Actual Attempt (Lifetime): No  Has subject engaged in non-suicidal self-injurious behavior? (Lifetime): No  Interrupted Attempts (Lifetime): No  Aborted or Self-Interrupted Attempt (Lifetime): No  Preparatory Acts or Behavior (Lifetime): No  C-SSRS Risk (Lifetime/Recent)  Calculated C-SSRS Risk Score (Lifetime/Recent): Low Risk    Validity of evaluation is impacted by presenting factors during interview, patient presented to the ED intoxicated, currently experiencing some withdrawl.   Comments regarding subjective versus objective responses to Saluda tool: collateral reports similar information as the patient regarding alcohol use and SI  Environmental or Psychosocial Events: other life  stressors and ongoing abuse of substances  Chronic Risk Factors: parental substance abuse issue   Warning Signs: talking or writing about death, dying, or suicide, hopelessness, increasing substance use or abuse, withdrawing from friends, family, and society and anxiety, agitation, unable to sleep, sleeping all the time  Protective Factors: strong bond to family unit, community support, or employment, responsibilities and duties to others, including pets and children and lives in a responsibly safe and stable environment  Interpretation of Risk Scoring, Risk Mitigation Interventions and Safety Plan:  Patient currently denies SI/HI/NSSI, reports SI comments were made while intoxicated, denies wanting to end his life.  Family has done some safety planning, they removed all guns from the patient's home and gave them to his best friend for safe keeping.      Does the patient have thoughts of harming others? No     Is the patient engaging in sexually inappropriate behavior?  no        Current Substance Abuse     Is there recent substance abuse? Yes- daily alcohol use, 10-12 shots of liquor, started drinking 10 years ago at age 32.  Patient's father has history of alcohol use disorder.  Patient has not accessed detox or CD treatment     Was a urine drug screen or blood alcohol level obtained: Yes patient arrived to ED with ETOH of .33, no UTOX        Mental Status Exam     Affect: Blunted and Flat   Appearance: Appropriate    Attention Span/Concentration: Attentive  Eye Contact: Variable   Fund of Knowledge: Appropriate    Language /Speech Content: Fluent   Language /Speech Volume: Normal    Language /Speech Rate/Productions: Normal and Slow    Recent Memory: Intact   Remote Memory: Intact   Mood: Anxious    Orientation to Person: Yes    Orientation to Place: Yes   Orientation to Time of Day: Yes    Orientation to Date: Yes    Situation (Do they understand why they are here?): Yes    Psychomotor Behavior: Normal     Thought Content: Clear   Thought Form: Goal Directed      History of commitment: No       Medication    Psychotropic medications: Librium  Medication changes made in the last two weeks: No    No current facility-administered medications for this encounter.     Current Outpatient Medications   Medication     chlordiazePOXIDE (LIBRIUM) 25 MG capsule     triamcinolone (KENALOG) 0.1 % external cream          Current Care Team    Primary Care Provider: Enmanuel Sandoval MD    clinic [place]   Psychiatrist: No  Therapist: No  : No     CTSS or ARMHS: No  ACT Team: No  Other: No      Diagnosis    300.00 (F41.9) Unspecified Anxiety Disorder   Substance-Related & Addictive Disorders Alcohol Use Disorder   303.90 (F10.20) Severe . - by history       Clinical Summary and Substantiation of Recommendations    Patient was cooperative and participated in assessment.  Patient does endorse recent passive SI while intoxicated, he denies SI/HI/NSSI upon assessment.  Patient denies history of suicide attempts, he denies wanting to end his life, denies having thoughts of a plan or intent.  Patient's primary concern is alcohol use, though is hesitant regarding treatment.  Today patient denied detox, but did schedule RUBI assessment at an outpatient clinic.      Disposition    Recommended disposition: Substance Abuse Disorder Treatment, Detox and Rule 25/RUBI Assessment       Reviewed case and recommendations with attending provider. Attending Name: Gutierrez Rogers MD        Attending concurs with disposition: Yes       Patient concurs with disposition: Yes       Guardian concurs with disposition: NA      Final disposition: Substance abuse disorder treatment  and Rule 25/RUBI Assessment.     Inpatient Details (if applicable):   Is patient admitted voluntarily:NA      Patient aware of potential for transfer if there is not appropriate placement? NA       Patient is willing to travel outside of the [facility] for placement? NA       Behavioral Intake Notified? NA     Outpatient Details (if applicable):   Aftercare plan and appointments placed in the AVS and provided to patient: Yes. Given to patient by LMHP and RN     Was lethal means counseling provided as a part of aftercare planning? Yes       Assessment Details    Patient interview started at: 7:40am and completed at: 8:30am.     Total duration spent on the patient case in minutes: 1.0 hrs      CPT code(s) utilized: 03558 - Psychotherapy for Crisis - 60 (30-74*) min       DORA Ortega, Psychotherapist  [facility] - Triage & Transition Services  Callback: [phone]      Aftercare Plan  If I am feeling unsafe or I am in a crisis, I will:   Contact my established care providers   Call the National Suicide Prevention Lifeline: 988  Go to the nearest emergency room   Call 911     Scheduled Appointments:    Date: Friday, 12/30/2022    Time: 10:00 am - 11:00 am  Provider: Daniela Robert MD, MD  Location: [facility], [address] [place] [address], [address], [place] [address]  Phone: [phone]  Type: Substance Use Disorder Assessment    Patient Instructions:  THIS IS ONLY A RESERVATION. PATIENT MUST CALL [phone] TO PRE-REGISTER AND CONFIRM APPOINTMENT. Please arrive 15 min early with ID and insurance card. We have 3 locations - intake appts available in [place] or [place], depending on day/time of week. Please call [phone] to verify location and pre-register to confirm appt.        Warning signs that I or other people might notice when a crisis is developing for me: I am having increasing suicidal thoughts that turn to plans with intent or means, I am having additional urges to self-harm, my emotions are of hopelessness, feeling like there's no way out, rage or anger, engaging in risky activities without thinking, withdrawing from family/friends, dramatic mood swings, drastic personality changes, use of alcohol or drugs, neglect of personal hygiene or cares     Things I  am able to do on my own or with others to cope or help me feel better: Spending quality time with loved ones, Staying hydrated, Eating balanced meals, Going for a walk every day, Take care of daily responsibilities/needs, Focus on positive self-talk vs negative self-talk, Exercise, listen to music, practice deep breathing, meditations, write in a journal, self-regulate, self check-in, ask for help when needed       Things I can use or do for distraction: Reach out to/spend time with family and friends, take a warm shower or bath, Exercise, chores or do a project, listen to music, watch movie/TV, journaling, reading a book, meditating, call a friend      Changes I can make to support my mental health and wellness:     -I will abstain from all mood altering chemicals not currently prescribed to me       -I will attend scheduled mental health therapy and psychiatric appointments and follow all recommendations      -I will commit to 30 minutes of self care daily - this can be as simple as taking a shower, going for a walk, cooking a meal, read, writing, etc      -I will practice square breathing when I begin to feel anxious - in breath through the nose for the count of 4 and the first line on the square. Out breath through the mouth for the count of 4 for the second line of the square. Repeat to complete the square. Repeat the square as many times as needed.      - I will use distraction skills of: going for walks, watching TV, spending time outside, calling a friend or family member      -Use community resources, including hotline numbers, [facility] crisis and support meetings      -Maintain a daily schedule/routine      -Practice deep breathing skills      -Download a meditation moris and spend 15-20 minutes per day mediating/relaxing. Some apps to download include: Calm, Headspace and Insight Timer. All 3 of these apps have free version      People in my life that I can ask for help: Arabella, my Mom, my sister      Your  [facility] has a mental health crisis team you can call 24/7: [facility] Mobile Crisis  [phone]     Other things that are important when I'm in crisis: Remember help is available, follow up with established providers, attend all appointments, take medications as prescribed??      Additional resources and information:     Substance Use Disorder Direct Access Resources    It is recommended that you abstain from all mood altering chemicals. Please contact the sober support hotline (395-457-4426) as needed; phones are answered 24 hours a day, 7 days a week.    To access substance use treatment you must have a comprehensive assessment completed to begin any treatment program.     If uninsured, please contact your county of residence for eligibility screen to substance use disorder evaluation and treatment:    [place] - [phone]   [facility] [phone]   [facility] [phone]   [place] - [phone]   [facility] [phone]   [facility] [phone]   [facility] [phone]   [place] - [phone]     If you have private insurance, call the customer service number on the back of your insurance card to find an in-network substance abuse use disorder assessment. The ideal provider will be a treatment facility, licensed in the [facility] [place].     Community RUBI Evaluations: Clients may call their county for a full list of providers - Availability and services listed belo are subject to change, please call the provider to confirm    [facility]  [phone]  [facility] [place], [place], [address]  *Please call the above number to schedule a comprehensive assessment for determination of level of care needs. In person and virtual appointments available Mon-Fri.    [facility], [address], [address] Floor, [address], [place] [address] (next to the outpatient lab)    Phone: [phone]   Provides bridging services to people with Opiate Use Disorders (OUD)  seeking care. This is a front door to Medication Assisted Treatments (MAT), ages 16+  Walk In hours: Monday-Friday 9:00am-3:00pm    [facility]  [phone]  Walk in Assessments: Mon-Friday 7a-1:45p  [address] [facility], [address]    [facility]  Central Access [phone]  [address] [place], [place], [address]  *by appointment only    [facility]  [phone] (phone consultation available )  Locations in: [place], [place], [facility], and [place], [place]  [place] virtual IOP [phone] or visit [email or web address]   Also offers LGBTQ programming     [facility]  [phone]  [address] [facility]  [place], [place], [address]  *Currently only offered via telehealth - call to set up an appointment    [facility]  [address] [place] [address], [address]  [facility]  For more information to to make a referral call:  [phone]  Walk-in on   9-11 a.m.    [facility]  [phone]  [address] [place], [place], [address]  *available by appointments only    [facility]  [phone]  [address] [place], [place], [address]  *available by appointment only    [facility]  [phone]  [address] [place], [place], [address]  *walk in assessments available M-F starting at 7 am.    [facility]  [phone]  Locations: [facility], [facility], and [place]  *Walk in assessments availble M-F starting at 8 am -virtual only    [facility]  [phone]  [address] [place] [address]    [facility]  Virtual + Locations: [place], [place], [place], [place], [facility], [place], [place], [place]   [phone]  *available by appointment only    [facility]  [phone]  [address], [address]    [facility]  [phone]  [address]  [place], [place], [address]  *available by appointment    [facility]  [phone]  [address]  [place], [place], [address]  *walk ins available M-TH from 9-3    [facility]  MAT program: [phone]  [address], [place], [address]    [place]  [phone]  Same day substance use disorder assessments are available Monday - Friday, via walk-in or by appointment at the [place] location.  [address], [address], [place] [address]     [facility] - adolescent and adult SUDs services  [phone]  Offer services Monday through Friday, as well as evening hours Monday through Thursday. Normally, a first appointment will be scheduled within one week  [email or web address]  Locations all over [place]    If you are intoxicated, you may be required to detox at a detox facility before starting treatment. The following are detox facilities that you can self present to. All detox facilities are able to help you complete an assessment prior to discharge if you choose:    [facility]: Arrive at a [facility] for immediate medical evaluation    [facility]: [address] [place], [place], [address].         [phone]    [facility]: [address] [place], [place], [address]  [phone]     [facility] ([facility]): [address] [place], [place], [address] [phone]     [facility]: [address] [place], [place], [address], [phone]    Ways to help cope with sobriety:    -- Take prescribed medicines as scheduled  -- Keep follow-up appointments  -- Talk to others about your concerns  -- Get regular exercise  -- Practice deep breathing skills  -- Eat a healthy diet  -- Use community resources, including hotline numbers, [facility] crisis and support meetings  -- Stay sober and avoid places/people/things associated with substance  use  --Maintain a daily schedule/routine  --Get at least 7-8 hours of sleep per night  --Create a list 10--20 healthy activities that you can do that are enjoyable and do not involve substance use  --Create daily goals (approx. 1-4 goals) per day and work to achieve them throughout the day.       Free Resources:    [facility] ([facility])  [facility] connects people seeking recovery to resources that help foster and sustain long-term recovery. Whether you are seeking resources for treatment, transportation, housing, job training, education, health care or other pathways to recovery, [facility] is a great place to start.  Phone: 974.802.4673. www.minnesotaShopTap.Celtro (Great listing of all types of recovery and non-recovery related resources)    Alcoholics Anonymous  Phone: 1-372-ALCOHOL  Website: HTTP://WWW.AA.ORG/  [facility] [place] (398-362-1217 or http://aaRangespan.org)  [facility] [place] (181-776-3041 or www.aastpaul.org)     Narcotics Anonymous  Phone: 329.619.7905  Website: www.[employer].Incentive Logic.    [facility] [address], [place], [place],  Phone: [phone]  Drop-in Hours: Monday-Friday 9-11:30 am. By appointment at other times.  Provides: [facility] is a drop-in center on the [facility] that provides a safe space for individuals who are homeless and have a history of chemical use. Sobriety is not a requirement but drugs and alcohol are not allowed on the property.  Services: Non-clients can access drop-in services such as Recovery and Harm Reduction Groups, referrals to case management, community activities, shower facilities, and a pool table. Individuals who are homeless and have chemical health needs may be eligible for enrollment into [facility]'s case management program. Clients and  work together to access benefits, treatment, health care, shelter, and external housing resources.       Crisis Lines  Crisis Text Line  Text 018849  You will be  "connected with a trained live crisis counselor to provide support.    Por nhan, chuchoo  VICKY a 799102 o texto a 442-AYUDAME en WhatsApp    [facility] (LGBTQ Youth Crisis Line)  3.628.005.1840  text START to 697-229      [employer]  Fast Tracker  Linking people to mental health and substance use disorder resources  [employer]     [place] [facility] Warm Line  Peer to peer support  Monday thru Saturday, 12 pm to 10 pm  [phone] or [phone]  Text \"Support\" to 90935    National [place] on Mental Illness (BERNICE)  577.614.0525 or 1.888.BERNICE.HELPS      Mental Health Apps  My3  https://MENA PRESTIGE.org/    VirtualHopeBox  https://ams AG/apps/virtual-hope-box/      Additional Information  Today you were seen by a licensed mental health professional through Triage and Transition services, Behavior[facility] Providers ([facility])  for a crisis assessment in the Emergency Department at [facility].  It is recommended that you follow up with your established providers (psychiatrist, mental health therapist, and/or primary care doctor - as relevant) as soon as possible. Coordinators from [facility] will be calling you in the next 24-48 hours to ensure that you have the resources you need.  You can also contact [facility] coordinators directly at [phone]. You may have been scheduled for or offered an appointment with a mental health provider. [facility] maintains an extensive network of licensed behavioral health providers to connect patients with the services they need.  We do not charge providers a fee to participate in our referral network.  We match patients with providers based on a patient's specific needs, insurance coverage, and location.  Our first effort will be to refer you to a provider within your care system, and will utilize providers outside your care system as needed.                  "

## 2022-12-29 NOTE — Clinical Note
Jonh Ritter was seen and treated in our emergency department on 12/29/2022.  He may return to work on 01/05/2023.  Jonh was seen for an acute medical process and should not return to work until Jan 5, or sooner if improved before then.     If you have any questions or concerns, please don't hesitate to call.      Tomas Botello MD

## 2022-12-29 NOTE — ED PROVIDER NOTES
History   Chief Complaint:  Alcohol and suicidal    HPI   History supplemented by electronic chart review and girlfriend at bedside    Jonh Ritter is a 42 year old male who presents with his girlfriend for evaluation of his alcohol use and suicidal thoughts.  He states he is an alcoholic and has been drinking heavily for quite some time, though increased use over the last few days.  He has been feeling more worthless and has been making some suicidal thoughts, though he states he does not intend to harm himself.  He states that he asked his best friend to remove all the firearms from his residence.  His girlfriend is concerned about his mental health as well as his excessive alcohol use.  Patient states he has never been to detox or treatment for his alcohol use, and is not sure whether he wants to go there today either, citing concerns that he might lose his job.    I personally saw the patient earlier in the year under similar circumstances, detox was arranged the patient then changed his mind and ultimately left the emergency department.  More recently he was seen here in September again for alcohol related issues, was prescribed Librium.    The patient states that he specifically would like to have IV fluids and IV Valium, stating that oral Valium has not made him feel good in the past.    Review of Systems  History limited by poor historian    Allergies:  Penicillins     Medications:    chlordiazePOXIDE (LIBRIUM) 25 MG capsule  triamcinolone (KENALOG) 0.1 % external cream        Past Medical History:    Past Medical History:   Diagnosis Date     Alcohol abuse      CARDIOVASCULAR SCREENING; LDL GOAL LESS THAN 160      Psoriasis        Patient Active Problem List    Diagnosis Date Noted     Alcohol abuse 05/29/2022     Priority: Medium     CARDIOVASCULAR SCREENING; LDL GOAL LESS THAN 160      Priority: Medium        Past Surgical History:    Past Surgical History:   Procedure Laterality Date     SURGICAL  "HISTORY OF -   1987    Rt Inguinal Hernia Repair        Family History:    family history includes Family History Negative in an other family member.    Social History:  Has 3yo child, +EtOH use    Physical Exam     Patient Vitals for the past 24 hrs:   BP Temp Temp src Pulse Resp SpO2 Height Weight   12/29/22 0600 124/80 -- -- 115 13 -- -- --   12/29/22 0514 132/88 -- -- 118 20 98 % -- --   12/29/22 0356 -- -- -- -- -- 93 % -- --   12/29/22 0355 (!) 137/92 -- -- (!) 121 -- -- -- --   12/29/22 0354 (!) 137/92 98  F (36.7  C) Oral (!) 121 22 96 % -- --   12/29/22 0335 (!) 145/91 97.3  F (36.3  C) Temporal (!) 135 20 97 % 1.88 m (6' 2\") 70.3 kg (155 lb)      Physical Exam  General: male sitting upright in room 12, girlfriend at bedside  HENT: mucous membranes moist, OP clear  Eyes: PERRL without nystagmus  CV: extremities well perfused, regular rhythm, no murmur audible, no LE edema  Resp: normal effort, no stridor, no cough observed  GI: abdomen soft and nontender, no guarding  MSK: no bony tenderness   Skin: appropriately warm and dry  Neuro: alert, minimally slow but easily comprehensible speech, oriented though somewhat poor historian, normal tone in extremities, ambulation not initially tested  Psych: slightly anxious, no tremors or tongue fasciculations, cooperative, reports feeling suicidal, no evidence of hallucinations    Emergency Department Course   Laboratory:  Labs Ordered and Resulted from Time of ED Arrival to Time of ED Departure   COMPREHENSIVE METABOLIC PANEL - Abnormal       Result Value    Sodium 137      Potassium 3.6      Chloride 101      Carbon Dioxide (CO2) 28      Anion Gap 8      Urea Nitrogen 6 (*)     Creatinine 0.86      Calcium 9.0      Glucose 134 (*)     Alkaline Phosphatase 91      AST 66 (*)     ALT 48      Protein Total 8.6      Albumin 4.5      Bilirubin Total 1.0      GFR Estimate >90     ETHYL ALCOHOL LEVEL - Abnormal    Alcohol ethyl 0.33 (*)    CBC WITH PLATELETS AND " DIFFERENTIAL - Abnormal    WBC Count 9.2      RBC Count 4.92      Hemoglobin 17.6      Hematocrit 48.9      MCV 99      MCH 35.8 (*)     MCHC 36.0      RDW 12.2      Platelet Count 316      % Neutrophils 67      % Lymphocytes 21      % Monocytes 10      % Eosinophils 1      % Basophils 1      % Immature Granulocytes 0      NRBCs per 100 WBC 0      Absolute Neutrophils 6.1      Absolute Lymphocytes 2.0      Absolute Monocytes 0.9      Absolute Eosinophils 0.1      Absolute Basophils 0.1      Absolute Immature Granulocytes 0.0      Absolute NRBCs 0.0     LIPASE - Normal    Lipase 113     COVID-19 VIRUS (CORONAVIRUS) BY PCR - Normal    SARS CoV2 PCR Negative        Emergency Department Course:  Reviewed:  I reviewed nursing notes, vitals, and past medical history    Assessments:  I obtained history and examined the patient as noted above.     ED Course as of 12/29/22 0751   u Dec 29, 2022   0625 I rechecked patient in room 20 (relocated), discussed treatment/eval options.  He does not want detox, continues to cite work concerns.  Plan for PO Ativan and DEC eval.   0649 I rechecked patient, provided printed work note.       Consults:   See above in ED Course    Interventions:  Medications   0.9% sodium chloride BOLUS (0 mLs Intravenous Stopped 12/29/22 0446)   ondansetron (ZOFRAN) injection 4 mg (4 mg Intravenous Given 12/29/22 0349)   0.9% sodium chloride BOLUS (0 mLs Intravenous Stopped 12/29/22 0628)   LORazepam (ATIVAN) tablet 1 mg (1 mg Oral Given 12/29/22 0639)      Disposition:  Signed out to Dr. Rogers at 7am shift change    Impression & Plan    Medical Decision Making:  He presents with evidence of some degree of alcohol intoxication in the setting of alcoholism.  He requested IV Valium for the moment he arrived, I explained to him on multiple occasions why this is not indicated.  He is clearly tolerating oral intake.  His tachycardia improved with time and treatments provided.  No tremors or tongue  fasciculations nor hypertension to suggest the presence of full alcohol withdrawal syndrome.  He perseverates on his need to go to work which he thinks precludes him from undergoing detox or alcohol treatment, both his girlfriend and I attempted to convince him of the importance of focusing on his addiction however he continues to state that he absolutely does not wish to go to detox or any other alcohol treatment at this time.  I directly discussed with him that he is at risk of developing alcohol withdrawal, and for this reason he was given a single dose of oral Ativan, with patient stating prior intolerance of oral Valium, and that alcohol withdrawal can be potentially fatal, though we are unable to convince him.  In the meantime he is awaiting DEC evaluation for his suicidal thoughts, noting that he is on a hold as he clearly represents a danger to himself and others.  Pelon does my colleague on the daytime shift for further disposition pending further input from DEC and his clinical course.    Diagnosis:    ICD-10-CM    1. Alcoholic intoxication with complication (H)  F10.929       2. Suicidal ideation  R45.851              12/29/2022   MD Rosmery Perez Jeffrey Alan, MD  12/29/22 0753

## 2022-12-29 NOTE — DISCHARGE INSTRUCTIONS
Aftercare Plan  If I am feeling unsafe or I am in a crisis, I will:   Contact my established care providers   Call the National Suicide Prevention Lifeline: 988  Go to the nearest emergency room   Call 911     Scheduled Appointments:    Date: Friday, 12/30/2022    Time: 10:00 am - 11:00 am  Provider: Daniela Robert MD, MD  Location: [facility], [address] , [address], [place] [address]  Phone: [phone]  Type: Substance Use Disorder Assessment    Patient Instructions:  THIS IS ONLY A RESERVATION. PATIENT MUST CALL [phone] TO PRE-REGISTER AND CONFIRM APPOINTMENT. Please arrive 15 min early with ID and insurance card. We have 3 locations - intake appts available in [place] or [place], depending on day/time of week. Please call [phone] to verify location and pre-register to confirm appt.        Warning signs that I or other people might notice when a crisis is developing for me: I am having increasing suicidal thoughts that turn to plans with intent or means, I am having additional urges to self-harm, my emotions are of hopelessness, feeling like there's no way out, rage or anger, engaging in risky activities without thinking, withdrawing from family/friends, dramatic mood swings, drastic personality changes, use of alcohol or drugs, neglect of personal hygiene or cares     Things I am able to do on my own or with others to cope or help me feel better: Spending quality time with loved ones, Staying hydrated, Eating balanced meals, Going for a walk every day, Take care of daily responsibilities/needs, Focus on positive self-talk vs negative self-talk, Exercise, listen to music, practice deep breathing, meditations, write in a journal, self-regulate, self check-in, ask for help when needed       Things I can use or do for distraction: Reach out to/spend time with family and friends, take a warm shower or bath, Exercise, chores or do a project, listen to music, watch movie/TV,  journaling, reading a book, meditating, call a friend      Changes I can make to support my mental health and wellness:     -I will abstain from all mood altering chemicals not currently prescribed to me       -I will attend scheduled mental health therapy and psychiatric appointments and follow all recommendations      -I will commit to 30 minutes of self care daily - this can be as simple as taking a shower, going for a walk, cooking a meal, read, writing, etc      -I will practice square breathing when I begin to feel anxious - in breath through the nose for the count of 4 and the first line on the square. Out breath through the mouth for the count of 4 for the second line of the square. Repeat to complete the square. Repeat the square as many times as needed.      - I will use distraction skills of: going for walks, watching TV, spending time outside, calling a friend or family member      -Use community resources, including hotline numbers, [facility] crisis and support meetings      -Maintain a daily schedule/routine      -Practice deep breathing skills      -Download a meditation moris and spend 15-20 minutes per day mediating/relaxing. Some apps to download include: Calm, Headspace and Insight Timer. All 3 of these apps have free version      People in my life that I can ask for help: Arabella, my Mom, my sister      Your [facility] has a mental health crisis team you can call 24/7: [facility] Mobile Crisis  [phone]     Other things that are important when I'm in crisis: Remember help is available, follow up with established providers, attend all appointments, take medications as prescribed??      Additional resources and information:     Substance Use Disorder Direct Access Resources    It is recommended that you abstain from all mood altering chemicals. Please contact the sober support hotline (956-963-1297) as needed; phones are answered 24 hours a day, 7 days a week.    To access substance use treatment  you must have a comprehensive assessment completed to begin any treatment program.     If uninsured, please contact your county of residence for eligibility screen to substance use disorder evaluation and treatment:    [place] - [phone]   [place] - [phone]   [place] - [phone]   [place] - [phone]   [place] - [phone]   [place] - [phone]   [facility] [phone]   [place] - [phone]     If you have private insurance, call the customer service number on the back of your insurance card to find an in-network substance abuse use disorder assessment. The ideal provider will be a treatment facility, licensed in the state of [place].     Community RUBI Evaluations: Clients may call their county for a full list of providers - Availability and services listed belo are subject to change, please call the provider to confirm    [facility]  [phone]  [address] [place] [address], [address]  *Please call the above number to schedule a comprehensive assessment for determination of level of care needs. In person and virtual appointments available Mon-Fri.    [facility] [address] Floor, [address], [place] [address] (next to the outpatient lab)    Phone: [phone]   Provides bridging services to people with Opiate Use Disorders (OUD) seeking care. This is a front door to Medication Assisted Treatments (MAT), ages 16+  Walk In hours: Monday-Friday 9:00am-3:00pm    [facility]  [phone]  Walk in Assessments: Mon-Friday 7a-1:45p  [address], [place], [address] [place] [address] Recovery [facility]  Central Access [phone]  [address] [place] [address], [address]  *by appointment only    [facility]  [phone] (phone consultation available )  Locations in: [place], [place], [facility], and [place], [place]  Dominican virtual IOP [phone] or visit [email or web address]    Also offers LGBTQ programming     [facility]  [phone]  [address]  [place], [place], [address]  *Currently only offered via telehealth - call to set up an appointment    [facility]  [address] [place], [place], [address]  [facility]  For more information to to make a referral call:  [phone]  Walk-in on Fridays  9-11 a.m.    [facility]  [phone]  [address], [place], [address]  *available by appointments only    [facility]  [phone]  [address] [place], [place], [address]  *available by appointment only    [facility]  [phone]  [address] [place], [place], [address]  *walk in assessments available M-F starting at 7 am.    [facility]  [phone]  Locations: [facility], [facility], and [place]  *Walk in assessments availble M-F starting at 8 am -virtual only    [facility]  [phone]  [address] [place] [address]    [facility]  Virtual + Locations: [place], [place], [place], [place], [facility], [facility], [place], [place]   [phone]  *available by appointment only    [facility]  [phone]  [address]  [place], [place], [address]    [facility]  [phone]  [address], [place], [address]  *available by appointment    [facility]  [phone]  [address] [place], [place], [address]  *walk ins available M-TH from 9-3    [facility]  MAT program: [phone]  [address] [place], [place], [address]    [facility]  [phone]  Same day substance use disorder assessments are available Monday - Friday, via walk-in or by appointment at the [place] location.  [address], [address], [place] [address]     [facility] - adolescent and adult SUDs services  [phone]  Offer services Monday  through Friday, as well as evening hours Monday through Thursday. Normally, a first appointment will be scheduled within one week  [email or web address]-[email or web address]-[email or web address]-treatment  Locations all over [place]    If you are intoxicated, you may be required to detox at a detox facility before starting treatment. The following are detox facilities that you can self present to. All detox facilities are able to help you complete an assessment prior to discharge if you choose:    [facility]: Arrive at a [facility] for immediate medical evaluation    [facility]: [address] [place], [place], [address].         [phone]    [facility]: [address] [place], [place], [address]  [phone]     [facility] ([facility]): [address], [place], [address]  [phone]     [facility]: [address] [place], [place], [address], [phone]    Ways to help cope with sobriety:    -- Take prescribed medicines as scheduled  -- Keep follow-up appointments  -- Talk to others about your concerns  -- Get regular exercise  -- Practice deep breathing skills  -- Eat a healthy diet  -- Use community resources, including hotline numbers, [facility] crisis and support meetings  -- Stay sober and avoid places/people/things associated with substance use  --Maintain a daily schedule/routine  --Get at least 7-8 hours of sleep per night  --Create a list 10--20 healthy activities that you can do that are enjoyable and do not involve substance use  --Create daily goals (approx. 1-4 goals) per day and work to achieve them throughout the day.       Free Resources:    [place] Recovery Connection ([facility])  [facility] connects people seeking recovery to resources that help foster and sustain long-term recovery. Whether you are seeking resources for treatment, transportation, housing, job training, education, health care or other pathways to recovery, [facility] is a  "great place to start.  Phone: 892.276.6131. www.[place] [facility] Hospital[facility].org (Great listing of all types of recovery and non-recovery related resources)    Alcoholics Anonymous  Phone: 1-800-ALCOHOL  Website: HTTP://WWW.AA.ORG/  [facility] [place] (659-126-4942 or http://a[place].org)  AA [place] (802-329-1070 or www.aastpaul.org)     Narcotics Anonymous  Phone: 317.606.4300  Website: www.Medical Reimbursements of [place].imeem.    [facility] [address], [place], [place],  Phone: [phone]  Drop-in Hours: Monday-Friday 9-11:30 am. By appointment at other times.  Provides: [facility] is a drop-in center on the [address] side [facility] that provides a safe space for individuals who are homeless and have a history of chemical use. Sobriety is not a requirement but drugs and alcohol are not allowed on the property.  Services: Non-clients can access drop-in services such as Recovery and Harm Reduction Groups, referrals to case management, community activities, shower facilities, and a pool table. Individuals who are homeless and have chemical health needs may be eligible for enrollment into [facility]'s case management program. Clients and  work together to access benefits, treatment, health care, shelter, and external housing resources.       Crisis Lines  Crisis Text Line  Text 293063  You will be connected with a trained live crisis counselor to provide support.    Por espanol, texto  VICKY a 993733 o texto a 442-AYUDAME en WhatsApp    The [facility]am[facility] Project (LGBTQ Youth Crisis Line)  3.227.772.6782  text START to 930-245      Community Resources  Fast Tracker  Linking people to mental health and substance use disorder resources  fasttrackSpectralmindn.org     [place] Mental Health Warm Line  Peer to peer support  Monday thru Saturday, 12 pm to 10 pm  718.283.5142 or 9.875.037.1843  Text \"Support\" to 46666    National [place] on Mental Illness (BERNICE)  199.769.7453 or " 1.888.BERNICE.HELPS      Mental Health Apps  My3  https://myVistronixpp.org/    VirtualHopeBox  https://Liquiteria/apps/virtual-hope-box/      Additional Information  Today you were seen by a licensed mental health professional through Triage and Transition services, [facility] ([facility])  for a crisis assessment in the Emergency Department at [facility].  It is recommended that you follow up with your established providers (psychiatrist, mental health therapist, and/or primary care doctor - as relevant) as soon as possible. Coordinators from [facility] will be calling you in the next 24-48 hours to ensure that you have the resources you need.  You can also contact [facility] coordinators directly at [phone]. You may have been scheduled for or offered an appointment with a mental health provider. [facility] maintains an extensive network of licensed behavioral health providers to connect patients with the services they need.  We do not charge providers a fee to participate in our referral network.  We match patients with providers based on a patient's specific needs, insurance coverage, and location.  Our first effort will be to refer you to a provider within your care system, and will utilize providers outside your care system as needed.

## 2022-12-29 NOTE — ED TRIAGE NOTES
Pt reports he is going thru withdrawals. Significant other at bedside reports pt is suicidal. Pt appears intoxicated and denies SI. Last drink 30 mins pta     Triage Assessment     Row Name 12/29/22 0336       Triage Assessment (Adult)    Airway WDL WDL       Respiratory WDL    Respiratory WDL WDL       Skin Circulation/Temperature WDL    Skin Circulation/Temperature WDL WDL       Cardiac WDL    Cardiac WDL X;rhythm    Pulse Rate & Regularity tachycardic       Peripheral/Neurovascular WDL    Peripheral Neurovascular WDL WDL       Cognitive/Neuro/Behavioral WDL    Cognitive/Neuro/Behavioral WDL X;mood/behavior    Mood/Behavior anxious

## 2022-12-30 ENCOUNTER — HOSPITAL ENCOUNTER (INPATIENT)
Facility: CLINIC | Age: 42
LOS: 3 days | Discharge: HOME | DRG: 897 | End: 2023-01-02
Attending: FAMILY MEDICINE | Admitting: PSYCHIATRY & NEUROLOGY
Payer: COMMERCIAL

## 2022-12-30 DIAGNOSIS — F10.10 ALCOHOL ABUSE: Primary | ICD-10-CM

## 2022-12-30 DIAGNOSIS — F10.229 ALCOHOL DEPENDENCE WITH INTOXICATION WITH COMPLICATION (H): ICD-10-CM

## 2022-12-30 DIAGNOSIS — Z20.822 CONTACT WITH AND (SUSPECTED) EXPOSURE TO COVID-19: ICD-10-CM

## 2022-12-30 LAB
ALBUMIN SERPL-MCNC: 4.3 G/DL (ref 3.4–5)
ALCOHOL BREATH TEST: 0.25 (ref 0–0.01)
ALP SERPL-CCNC: 89 U/L (ref 40–150)
ALT SERPL W P-5'-P-CCNC: 60 U/L (ref 0–70)
AMPHETAMINES UR QL SCN: NORMAL
ANION GAP SERPL CALCULATED.3IONS-SCNC: 7 MMOL/L (ref 3–14)
AST SERPL W P-5'-P-CCNC: 87 U/L (ref 0–45)
BARBITURATES UR QL: NORMAL
BASOPHILS # BLD AUTO: 0.1 10E3/UL (ref 0–0.2)
BASOPHILS NFR BLD AUTO: 1 %
BENZODIAZ UR QL: NORMAL
BILIRUB SERPL-MCNC: 1.9 MG/DL (ref 0.2–1.3)
BUN SERPL-MCNC: 4 MG/DL (ref 7–30)
CALCIUM SERPL-MCNC: 9.7 MG/DL (ref 8.5–10.1)
CANNABINOIDS UR QL SCN: NORMAL
CHLORIDE BLD-SCNC: 103 MMOL/L (ref 94–109)
CO2 SERPL-SCNC: 31 MMOL/L (ref 20–32)
COCAINE UR QL: NORMAL
CREAT SERPL-MCNC: 0.86 MG/DL (ref 0.66–1.25)
EOSINOPHIL # BLD AUTO: 0.1 10E3/UL (ref 0–0.7)
EOSINOPHIL NFR BLD AUTO: 1 %
ERYTHROCYTE [DISTWIDTH] IN BLOOD BY AUTOMATED COUNT: 12.2 % (ref 10–15)
ETHANOL SERPL-MCNC: 0.29 G/DL
FLUAV RNA SPEC QL NAA+PROBE: NEGATIVE
FLUBV RNA RESP QL NAA+PROBE: NEGATIVE
GFR SERPL CREATININE-BSD FRML MDRD: >90 ML/MIN/1.73M2
GLUCOSE BLD-MCNC: 106 MG/DL (ref 70–99)
HCT VFR BLD AUTO: 46.5 % (ref 40–53)
HGB BLD-MCNC: 16.6 G/DL (ref 13.3–17.7)
IMM GRANULOCYTES # BLD: 0 10E3/UL
IMM GRANULOCYTES NFR BLD: 0 %
LIPASE SERPL-CCNC: 120 U/L (ref 73–393)
LYMPHOCYTES # BLD AUTO: 1.2 10E3/UL (ref 0.8–5.3)
LYMPHOCYTES NFR BLD AUTO: 22 %
MCH RBC QN AUTO: 35.9 PG (ref 26.5–33)
MCHC RBC AUTO-ENTMCNC: 35.7 G/DL (ref 31.5–36.5)
MCV RBC AUTO: 101 FL (ref 78–100)
MONOCYTES # BLD AUTO: 0.5 10E3/UL (ref 0–1.3)
MONOCYTES NFR BLD AUTO: 9 %
NEUTROPHILS # BLD AUTO: 3.7 10E3/UL (ref 1.6–8.3)
NEUTROPHILS NFR BLD AUTO: 67 %
NRBC # BLD AUTO: 0 10E3/UL
NRBC BLD AUTO-RTO: 0 /100
OPIATES UR QL SCN: NORMAL
PLATELET # BLD AUTO: 242 10E3/UL (ref 150–450)
POTASSIUM BLD-SCNC: 4.1 MMOL/L (ref 3.4–5.3)
PROT SERPL-MCNC: 8.1 G/DL (ref 6.8–8.8)
RBC # BLD AUTO: 4.62 10E6/UL (ref 4.4–5.9)
RSV RNA SPEC NAA+PROBE: NEGATIVE
SARS-COV-2 RNA RESP QL NAA+PROBE: NEGATIVE
SODIUM SERPL-SCNC: 141 MMOL/L (ref 133–144)
WBC # BLD AUTO: 5.6 10E3/UL (ref 4–11)

## 2022-12-30 PROCEDURE — HZ2ZZZZ DETOXIFICATION SERVICES FOR SUBSTANCE ABUSE TREATMENT: ICD-10-PCS | Performed by: FAMILY MEDICINE

## 2022-12-30 PROCEDURE — 250N000011 HC RX IP 250 OP 636: Performed by: PSYCHIATRY & NEUROLOGY

## 2022-12-30 PROCEDURE — 250N000013 HC RX MED GY IP 250 OP 250 PS 637: Performed by: PSYCHIATRY & NEUROLOGY

## 2022-12-30 PROCEDURE — 258N000001 HC RX 258: Performed by: FAMILY MEDICINE

## 2022-12-30 PROCEDURE — 99285 EMERGENCY DEPT VISIT HI MDM: CPT | Performed by: FAMILY MEDICINE

## 2022-12-30 PROCEDURE — 36415 COLL VENOUS BLD VENIPUNCTURE: CPT | Performed by: FAMILY MEDICINE

## 2022-12-30 PROCEDURE — 96375 TX/PRO/DX INJ NEW DRUG ADDON: CPT | Performed by: FAMILY MEDICINE

## 2022-12-30 PROCEDURE — C9803 HOPD COVID-19 SPEC COLLECT: HCPCS | Performed by: FAMILY MEDICINE

## 2022-12-30 PROCEDURE — 128N000001 HC R&B CD/MH ADULT

## 2022-12-30 PROCEDURE — 83690 ASSAY OF LIPASE: CPT | Performed by: FAMILY MEDICINE

## 2022-12-30 PROCEDURE — 85048 AUTOMATED LEUKOCYTE COUNT: CPT | Performed by: FAMILY MEDICINE

## 2022-12-30 PROCEDURE — 250N000013 HC RX MED GY IP 250 OP 250 PS 637: Performed by: FAMILY MEDICINE

## 2022-12-30 PROCEDURE — 80307 DRUG TEST PRSMV CHEM ANLYZR: CPT | Performed by: FAMILY MEDICINE

## 2022-12-30 PROCEDURE — 82077 ASSAY SPEC XCP UR&BREATH IA: CPT | Performed by: FAMILY MEDICINE

## 2022-12-30 PROCEDURE — 96365 THER/PROPH/DIAG IV INF INIT: CPT | Performed by: FAMILY MEDICINE

## 2022-12-30 PROCEDURE — 99285 EMERGENCY DEPT VISIT HI MDM: CPT | Mod: 25 | Performed by: FAMILY MEDICINE

## 2022-12-30 PROCEDURE — 250N000009 HC RX 250: Performed by: FAMILY MEDICINE

## 2022-12-30 PROCEDURE — 80053 COMPREHEN METABOLIC PANEL: CPT | Performed by: FAMILY MEDICINE

## 2022-12-30 PROCEDURE — 87637 SARSCOV2&INF A&B&RSV AMP PRB: CPT | Performed by: FAMILY MEDICINE

## 2022-12-30 PROCEDURE — 82075 ASSAY OF BREATH ETHANOL: CPT | Performed by: FAMILY MEDICINE

## 2022-12-30 PROCEDURE — 128N000004 HC R&B CD ADULT

## 2022-12-30 PROCEDURE — 250N000011 HC RX IP 250 OP 636: Performed by: FAMILY MEDICINE

## 2022-12-30 RX ORDER — LORAZEPAM 2 MG/ML
1 INJECTION INTRAMUSCULAR ONCE
Status: COMPLETED | OUTPATIENT
Start: 2022-12-30 | End: 2022-12-30

## 2022-12-30 RX ORDER — CALCIUM CARBONATE 500 MG/1
500 TABLET, CHEWABLE ORAL DAILY PRN
Status: DISCONTINUED | OUTPATIENT
Start: 2022-12-30 | End: 2023-01-02 | Stop reason: HOSPADM

## 2022-12-30 RX ORDER — MAGNESIUM HYDROXIDE/ALUMINUM HYDROXICE/SIMETHICONE 120; 1200; 1200 MG/30ML; MG/30ML; MG/30ML
30 SUSPENSION ORAL EVERY 4 HOURS PRN
Status: DISCONTINUED | OUTPATIENT
Start: 2022-12-30 | End: 2023-01-02 | Stop reason: HOSPADM

## 2022-12-30 RX ORDER — NICOTINE 21 MG/24HR
1 PATCH, TRANSDERMAL 24 HOURS TRANSDERMAL DAILY
Status: DISCONTINUED | OUTPATIENT
Start: 2022-12-30 | End: 2023-01-02 | Stop reason: HOSPADM

## 2022-12-30 RX ORDER — ONDANSETRON 4 MG/1
4 TABLET, ORALLY DISINTEGRATING ORAL ONCE
Status: COMPLETED | OUTPATIENT
Start: 2022-12-30 | End: 2022-12-30

## 2022-12-30 RX ORDER — DIAZEPAM 5 MG
5-20 TABLET ORAL EVERY 30 MIN PRN
Status: DISCONTINUED | OUTPATIENT
Start: 2022-12-30 | End: 2023-01-02 | Stop reason: HOSPADM

## 2022-12-30 RX ORDER — NICOTINE 21 MG/24HR
1 PATCH, TRANSDERMAL 24 HOURS TRANSDERMAL DAILY
Status: DISCONTINUED | OUTPATIENT
Start: 2022-12-31 | End: 2022-12-30

## 2022-12-30 RX ORDER — TRIAMCINOLONE ACETONIDE 1 MG/G
CREAM TOPICAL 3 TIMES DAILY PRN
Status: DISCONTINUED | OUTPATIENT
Start: 2022-12-30 | End: 2022-12-31

## 2022-12-30 RX ORDER — OLANZAPINE 10 MG/1
10 TABLET ORAL 3 TIMES DAILY PRN
Status: DISCONTINUED | OUTPATIENT
Start: 2022-12-30 | End: 2023-01-02 | Stop reason: HOSPADM

## 2022-12-30 RX ORDER — FOLIC ACID 1 MG/1
1 TABLET ORAL DAILY
Status: DISCONTINUED | OUTPATIENT
Start: 2022-12-31 | End: 2023-01-02 | Stop reason: HOSPADM

## 2022-12-30 RX ORDER — IBUPROFEN 200 MG
200 TABLET ORAL EVERY 4 HOURS PRN
Status: ON HOLD | COMMUNITY
End: 2023-01-02

## 2022-12-30 RX ORDER — LORAZEPAM 1 MG/1
1-4 TABLET ORAL EVERY 30 MIN PRN
Status: DISCONTINUED | OUTPATIENT
Start: 2022-12-30 | End: 2022-12-30

## 2022-12-30 RX ORDER — ONDANSETRON 4 MG/1
4 TABLET, FILM COATED ORAL EVERY 6 HOURS PRN
Status: DISCONTINUED | OUTPATIENT
Start: 2022-12-30 | End: 2022-12-31

## 2022-12-30 RX ORDER — POLYETHYLENE GLYCOL 3350 17 G/17G
17 POWDER, FOR SOLUTION ORAL DAILY PRN
Status: DISCONTINUED | OUTPATIENT
Start: 2022-12-30 | End: 2023-01-02 | Stop reason: HOSPADM

## 2022-12-30 RX ORDER — IBUPROFEN 200 MG
200 TABLET ORAL EVERY 4 HOURS PRN
Status: DISCONTINUED | OUTPATIENT
Start: 2022-12-30 | End: 2023-01-02 | Stop reason: HOSPADM

## 2022-12-30 RX ORDER — ACETAMINOPHEN 325 MG/1
650 TABLET ORAL EVERY 4 HOURS PRN
Status: DISCONTINUED | OUTPATIENT
Start: 2022-12-30 | End: 2023-01-02 | Stop reason: HOSPADM

## 2022-12-30 RX ORDER — OLANZAPINE 10 MG/2ML
10 INJECTION, POWDER, FOR SOLUTION INTRAMUSCULAR 3 TIMES DAILY PRN
Status: DISCONTINUED | OUTPATIENT
Start: 2022-12-30 | End: 2023-01-02 | Stop reason: HOSPADM

## 2022-12-30 RX ORDER — HYDROXYZINE HYDROCHLORIDE 25 MG/1
25 TABLET, FILM COATED ORAL EVERY 4 HOURS PRN
Status: DISCONTINUED | OUTPATIENT
Start: 2022-12-30 | End: 2023-01-02 | Stop reason: HOSPADM

## 2022-12-30 RX ORDER — ATENOLOL 50 MG/1
50 TABLET ORAL DAILY PRN
Status: DISCONTINUED | OUTPATIENT
Start: 2022-12-30 | End: 2023-01-02 | Stop reason: HOSPADM

## 2022-12-30 RX ORDER — LANOLIN ALCOHOL/MO/W.PET/CERES
3 CREAM (GRAM) TOPICAL
Status: DISCONTINUED | OUTPATIENT
Start: 2022-12-30 | End: 2023-01-02 | Stop reason: HOSPADM

## 2022-12-30 RX ORDER — MULTIPLE VITAMINS W/ MINERALS TAB 9MG-400MCG
1 TAB ORAL DAILY
Status: DISCONTINUED | OUTPATIENT
Start: 2022-12-31 | End: 2023-01-02 | Stop reason: HOSPADM

## 2022-12-30 RX ADMIN — CALCIUM CARBONATE (ANTACID) CHEW TAB 500 MG 500 MG: 500 CHEW TAB at 21:06

## 2022-12-30 RX ADMIN — MELATONIN TAB 3 MG 3 MG: 3 TAB at 21:11

## 2022-12-30 RX ADMIN — CALCIUM CARBONATE (ANTACID) CHEW TAB 500 MG 500 MG: 500 CHEW TAB at 16:13

## 2022-12-30 RX ADMIN — LORAZEPAM 1 MG: 1 TABLET ORAL at 17:07

## 2022-12-30 RX ADMIN — NICOTINE 1 PATCH: 21 PATCH, EXTENDED RELEASE TRANSDERMAL at 14:03

## 2022-12-30 RX ADMIN — ONDANSETRON 4 MG: 4 TABLET, ORALLY DISINTEGRATING ORAL at 16:13

## 2022-12-30 RX ADMIN — LORAZEPAM 1 MG: 1 TABLET ORAL at 15:31

## 2022-12-30 RX ADMIN — DIAZEPAM 10 MG: 5 TABLET ORAL at 21:06

## 2022-12-30 RX ADMIN — FOLIC ACID: 5 INJECTION, SOLUTION INTRAMUSCULAR; INTRAVENOUS; SUBCUTANEOUS at 14:08

## 2022-12-30 RX ADMIN — LORAZEPAM 1 MG: 2 INJECTION INTRAMUSCULAR; INTRAVENOUS at 16:20

## 2022-12-30 RX ADMIN — ONDANSETRON 4 MG: 4 TABLET ORAL at 21:26

## 2022-12-30 RX ADMIN — LORAZEPAM 2 MG: 1 TABLET ORAL at 19:55

## 2022-12-30 ASSESSMENT — ACTIVITIES OF DAILY LIVING (ADL)
CHANGE_IN_FUNCTIONAL_STATUS_SINCE_ONSET_OF_CURRENT_ILLNESS/INJURY: NO
DOING_ERRANDS_INDEPENDENTLY_DIFFICULTY: NO
WALKING_OR_CLIMBING_STAIRS_DIFFICULTY: NO
WEAR_GLASSES_OR_BLIND: NO
TOILETING_ISSUES: NO
CONCENTRATING,_REMEMBERING_OR_MAKING_DECISIONS_DIFFICULTY: NO
DIFFICULTY_EATING/SWALLOWING: NO
ADLS_ACUITY_SCORE: 35
ADLS_ACUITY_SCORE: 28
DRESSING/BATHING_DIFFICULTY: NO
ADLS_ACUITY_SCORE: 28
FALL_HISTORY_WITHIN_LAST_SIX_MONTHS: NO

## 2022-12-30 NOTE — ED PROVIDER NOTES
[facility] EMERGENCY DEPARTMENT ([facility])    12/30/22      ED PROVIDER NOTE        History     Chief Complaint   Patient presents with     Alcohol Problem     Seeking detox for alcohol, drinks at least half a liter of liquor daily, last drink a few hours ago, denies history of withdrawal seizures     Suicidal     Thoughts of shooting self     The history is provided by the patient and medical records.   Alcohol Problem      Jonh Ritter is a 42 year old male with a previous medical history of alcohol abuse presenting to the ED seeking detox from alcohol and with suicidal ideation. Patient currently drinking about half a liter of hard alcohol per day. He was evaluated at this facility yesterday, where at the time he was uninterested in going through detox. Patient currently making suicidal remarks and states that he has been thinking of shooting himself.  Later patient states that he would never act on his suicidal thoughts and that he has made arrangements to have friends remove any possible potential injurious items from his house.  Patient states that he is definitely wanting to deal with his alcoholism and that he has not been sober for many years and is interested in both detox and treatment.  Patient denies any history of seizures or DTs and notes that he does not have any change in appetite sleep or other systemic symptoms.    Past Medical History  Past Medical History:   Diagnosis Date     Alcohol abuse      CARDIOVASCULAR SCREENING; LDL GOAL LESS THAN 160      Psoriasis      Past Surgical History:   Procedure Laterality Date     SURGICAL HISTORY OF -   1987    Rt Inguinal Hernia Repair     chlordiazePOXIDE (LIBRIUM) 25 MG capsule  triamcinolone (KENALOG) 0.1 % external cream      Allergies   Allergen Reactions     Penicillins      As infant     Family History  Family History   Problem Relation Age of Onset     Family History Negative Other      Social History   Social History     Tobacco Use      Smoking status: Every Day     Packs/day: 1.00     Types: Cigarettes     Smokeless tobacco: Never   Substance Use Topics     Alcohol use: Yes     Alcohol/week: 7.0 standard drinks     Types: 7 Standard drinks or equivalent per week     Drug use: No      Past medical history, past surgical history, medications, allergies, family history, and social history were reviewed with the patient. No additional pertinent items.       Review of Systems   Psychiatric/Behavioral: Positive for suicidal ideas.     A complete review of systems was performed with pertinent positives and negatives noted in the HPI, and all other systems negative.    Physical Exam   BP: 134/85  Pulse: 116  Temp: 97.8  F (36.6  C)  Resp: 16  SpO2: 97 %  Physical Exam  Constitutional:       General: He is not in acute distress.     Appearance: He is not diaphoretic.   HENT:      Head: Atraumatic.   Eyes:      General: No scleral icterus.     Pupils: Pupils are equal, round, and reactive to light.   Cardiovascular:      Heart sounds: Normal heart sounds.   Pulmonary:      Effort: No respiratory distress.      Breath sounds: Normal breath sounds.   Abdominal:      General: Bowel sounds are normal.      Palpations: Abdomen is soft.      Tenderness: There is no abdominal tenderness.   Musculoskeletal:         General: No tenderness.   Skin:     General: Skin is warm.      Findings: No rash.   Neurological:      General: No focal deficit present.      Mental Status: He is oriented to person, place, and time.      Sensory: No sensory deficit.      Motor: No weakness.      Coordination: Coordination normal.   Psychiatric:         Mood and Affect: Mood is anxious.         Speech: Speech normal.         Behavior: Behavior is cooperative.         Thought Content: Thought content includes suicidal ideation. Thought content does not include suicidal plan.         ED Course      Procedures    The medical record was reviewed and interpreted.  Current labs reviewed  and interpreted.    Suicide assessment completed by mental health (DGRABIELC., LCSW, etc.)       Results for orders placed or performed during the hospital encounter of 12/30/22   Comprehensive metabolic panel     Status: Abnormal   Result Value Ref Range    Sodium 141 133 - 144 mmol/L    Potassium 4.1 3.4 - 5.3 mmol/L    Chloride 103 94 - 109 mmol/L    Carbon Dioxide (CO2) 31 20 - 32 mmol/L    Anion Gap 7 3 - 14 mmol/L    Urea Nitrogen 4 (L) 7 - 30 mg/dL    Creatinine 0.86 0.66 - 1.25 mg/dL    Calcium 9.7 8.5 - 10.1 mg/dL    Glucose 106 (H) 70 - 99 mg/dL    Alkaline Phosphatase 89 40 - 150 U/L    AST 87 (H) 0 - 45 U/L    ALT 60 0 - 70 U/L    Protein Total 8.1 6.8 - 8.8 g/dL    Albumin 4.3 3.4 - 5.0 g/dL    Bilirubin Total 1.9 (H) 0.2 - 1.3 mg/dL    GFR Estimate >90 >60 mL/min/1.73m2   Lipase     Status: Normal   Result Value Ref Range    Lipase 120 73 - 393 U/L   Ethyl Alcohol Level     Status: Abnormal   Result Value Ref Range    Alcohol ethyl 0.29 (H) <=0.01 g/dL   CBC with platelets and differential     Status: Abnormal   Result Value Ref Range    WBC Count 5.6 4.0 - 11.0 10e3/uL    RBC Count 4.62 4.40 - 5.90 10e6/uL    Hemoglobin 16.6 13.3 - 17.7 g/dL    Hematocrit 46.5 40.0 - 53.0 %     (H) 78 - 100 fL    MCH 35.9 (H) 26.5 - 33.0 pg    MCHC 35.7 31.5 - 36.5 g/dL    RDW 12.2 10.0 - 15.0 %    Platelet Count 242 150 - 450 10e3/uL    % Neutrophils 67 %    % Lymphocytes 22 %    % Monocytes 9 %    % Eosinophils 1 %    % Basophils 1 %    % Immature Granulocytes 0 %    NRBCs per 100 WBC 0 <1 /100    Absolute Neutrophils 3.7 1.6 - 8.3 10e3/uL    Absolute Lymphocytes 1.2 0.8 - 5.3 10e3/uL    Absolute Monocytes 0.5 0.0 - 1.3 10e3/uL    Absolute Eosinophils 0.1 0.0 - 0.7 10e3/uL    Absolute Basophils 0.1 0.0 - 0.2 10e3/uL    Absolute Immature Granulocytes 0.0 <=0.4 10e3/uL    Absolute NRBCs 0.0 10e3/uL   Asymptomatic Influenza A/B & SARS-CoV2 (COVID-19) Virus PCR Multiplex Nasopharyngeal     Status: Normal    Specimen:  Nasopharyngeal; Swab   Result Value Ref Range    Influenza A PCR Negative Negative    Influenza B PCR Negative Negative    RSV PCR Negative Negative    SARS CoV2 PCR Negative Negative    Narrative    Testing was performed using the Xpert Xpress CoV2/Flu/RSV Assay on the Cepheid GeneXpert Instrument. This test should be ordered for the detection of SARS-CoV-2 and influenza viruses in individuals who meet clinical and/or epidemiological criteria. Test performance is unknown in asymptomatic patients. This test is for in vitro diagnostic use under the FDA EUA for laboratories certified under CLIA to perform high or moderate complexity testing. This test has not been FDA cleared or approved. A negative result does not rule out the presence of PCR inhibitors in the specimen or target RNA in concentration below the limit of detection for the assay. If only one viral target is positive but coinfection with multiple targets is suspected, the sample should be re-tested with another FDA cleared, approved, or authorized test, if coinfection would change clinical management. This test was validated by the [facility]. These laboratories are certified under the Clinical Laboratory Improvement Amendments of 1988 (CLIA-88) as qualified to perform high complexity laboratory testing.   Alcohol breath test POCT     Status: Abnormal   Result Value Ref Range    Alcohol Breath Test 0.251 (A) 0.00 - 0.01   CBC with platelets differential     Status: Abnormal    Narrative    The following orders were created for panel order CBC with platelets differential.  Procedure                               Abnormality         Status                     ---------                               -----------         ------                     CBC with platelets and d...[[id number]]  Abnormal            Final result                 Please view results for these tests on the individual orders.       Medications   nicotine (NICODERM CQ) 21  MG/24HR 24 hr patch 1 patch (1 patch Transdermal Patch/Med Applied 12/30/22 1403)     And   nicotine Patch in Place ( Transdermal Patch in Place 12/30/22 1409)   LORazepam (ATIVAN) tablet 1-4 mg (1 mg Oral Given 12/30/22 1707)   calcium carbonate (TUMS) chewable tablet 500 mg (500 mg Oral Given 12/30/22 1613)   dextrose 5% and 0.45% NaCl 1,000 mL with Infuvite Adult 10 mL, thiamine 100 mg, folic acid 1 mg infusion ( Intravenous Stopped 12/30/22 1437)   ondansetron (ZOFRAN ODT) ODT tab 4 mg (4 mg Oral Given 12/30/22 1613)   LORazepam (ATIVAN) injection 1 mg (1 mg Intravenous Given 12/30/22 1620)        Assessments & Plan (with Medical Decision Making)       I have reviewed the nursing notes. I have reviewed the findings, diagnosis, plan and need for follow up with the patient.    Medical Decision Making  The patient presented with a problem that is a chronic illness severe exacerbation, progression, or side effect of treatment.    The patient's evaluation involved:  ordering and review of 3+ test(s) (see separate area of note for details)    The patient's management involved a decision regarding hospitalization.    Patient with significant alcohol dependence at this time will require inpatient detox treatment patient was started on Ativan [facility] protocol and will be managed with Ativan on the inpatient unit once again denies any intent to act on thoughts of suicide does admit to ongoing depressive symptoms.        Final diagnoses:   Alcohol dependence with intoxication with complication (H)   I, Елена Torres, am serving as a trained medical scribe to document services personally performed by Lamin Brunner MD, based on the provider's statements to me.     Lamin GALLAGHER MD, was physically present and have reviewed and verified the accuracy of this note documented by Елена Torres.      --  Lamin Brunner  [facility] EMERGENCY DEPARTMENT  12/30/2022     Lamin Brunner  MD Shahid  12/30/22 8228

## 2022-12-30 NOTE — ED TRIAGE NOTES
Triage Assessment     Row Name 12/30/22 9778       Triage Assessment (Adult)    Airway WDL WDL       Respiratory WDL    Respiratory WDL WDL       Skin Circulation/Temperature WDL    Skin Circulation/Temperature WDL WDL       Cardiac WDL    Cardiac WDL WDL       Peripheral/Neurovascular WDL    Peripheral Neurovascular WDL WDL       Cognitive/Neuro/Behavioral WDL    Cognitive/Neuro/Behavioral WDL WDL

## 2022-12-30 NOTE — ED NOTES
Pt sleeping on pull out chair. Pt does not look to be in withdrawals, nor did he score it on the MSSA protocol. Writer will continue to monitor pt.

## 2022-12-30 NOTE — ED NOTES
"Pt was sleeping in pull out chair. Pt abruptly woke up due to cramps in legs. Pt fixated on nausea. PRN medication given, zofran. Pt in the bathroom sitting in the ground. Pt states, \"If I puke this up, will I get a different medication regimen\". Pt adamant that he wants IV medication for withdrawal symptoms. Pt persists that he is in full blow withdrawal symptoms, vitals WNL except pule in the mid 100s. Pt request a debby, stating that, sometimes that helps. Pt stating he needs to get into a room, get his nausea under control or he wants to leave to go to the liquor store and drink. Pt states, \"isn't there a medication you can just give me to knock me out\".   "

## 2022-12-30 NOTE — ED NOTES
"Pt in hallway eating crackers, when writer told him not to, to not exacerbate his stomach. Pt is not listening. Writer asked pt if he stomach felt better, and he said, \"I don't know\", and continues to eat. Pt asking writer how long after the IV ativan he should wait until he eats his sub sandwich. Writer again, suggested no food right now especially with his stomach hurting. Pt states, \"will this medication make my shakes go away?\".  "

## 2022-12-30 NOTE — ED NOTES
Pt states he is feeling very anxious. Pt drinking monster energy drink. Pt states his pulse feels very high. Writer told pt writer would recheck it. MD notified of pt being anxious.

## 2022-12-31 PROCEDURE — H2035 A/D TX PROGRAM, PER HOUR: HCPCS | Mod: HQ

## 2022-12-31 PROCEDURE — 250N000013 HC RX MED GY IP 250 OP 250 PS 637: Performed by: FAMILY MEDICINE

## 2022-12-31 PROCEDURE — 250N000013 HC RX MED GY IP 250 OP 250 PS 637: Performed by: PSYCHIATRY & NEUROLOGY

## 2022-12-31 PROCEDURE — 99232 SBSQ HOSP IP/OBS MODERATE 35: CPT | Performed by: PHYSICIAN ASSISTANT

## 2022-12-31 PROCEDURE — 99223 1ST HOSP IP/OBS HIGH 75: CPT | Mod: AI | Performed by: PSYCHIATRY & NEUROLOGY

## 2022-12-31 PROCEDURE — 250N000011 HC RX IP 250 OP 636: Performed by: PHYSICIAN ASSISTANT

## 2022-12-31 PROCEDURE — 128N000004 HC R&B CD ADULT

## 2022-12-31 PROCEDURE — 128N000001 HC R&B CD/MH ADULT

## 2022-12-31 PROCEDURE — 250N000013 HC RX MED GY IP 250 OP 250 PS 637: Performed by: PHYSICIAN ASSISTANT

## 2022-12-31 RX ORDER — TRIAMCINOLONE ACETONIDE 1 MG/G
CREAM TOPICAL 3 TIMES DAILY
Status: DISCONTINUED | OUTPATIENT
Start: 2022-12-31 | End: 2023-01-02 | Stop reason: HOSPADM

## 2022-12-31 RX ORDER — ONDANSETRON 4 MG/1
4 TABLET, ORALLY DISINTEGRATING ORAL EVERY 6 HOURS PRN
Status: DISCONTINUED | OUTPATIENT
Start: 2022-12-31 | End: 2023-01-02 | Stop reason: HOSPADM

## 2022-12-31 RX ADMIN — DIAZEPAM 10 MG: 5 TABLET ORAL at 16:22

## 2022-12-31 RX ADMIN — CALCIUM CARBONATE (ANTACID) CHEW TAB 500 MG 500 MG: 500 CHEW TAB at 16:22

## 2022-12-31 RX ADMIN — DIAZEPAM 10 MG: 5 TABLET ORAL at 02:53

## 2022-12-31 RX ADMIN — TRIAMCINOLONE ACETONIDE: 1 CREAM TOPICAL at 20:31

## 2022-12-31 RX ADMIN — DIAZEPAM 10 MG: 5 TABLET ORAL at 05:30

## 2022-12-31 RX ADMIN — DIAZEPAM 10 MG: 5 TABLET ORAL at 00:33

## 2022-12-31 RX ADMIN — DIAZEPAM 10 MG: 5 TABLET ORAL at 09:04

## 2022-12-31 RX ADMIN — ONDANSETRON 4 MG: 4 TABLET, ORALLY DISINTEGRATING ORAL at 14:23

## 2022-12-31 RX ADMIN — ONDANSETRON 4 MG: 4 TABLET, ORALLY DISINTEGRATING ORAL at 09:19

## 2022-12-31 RX ADMIN — ONDANSETRON 4 MG: 4 TABLET, ORALLY DISINTEGRATING ORAL at 21:54

## 2022-12-31 RX ADMIN — NICOTINE 1 PATCH: 21 PATCH, EXTENDED RELEASE TRANSDERMAL at 09:05

## 2022-12-31 RX ADMIN — DIAZEPAM 10 MG: 5 TABLET ORAL at 20:29

## 2022-12-31 RX ADMIN — ONDANSETRON 4 MG: 4 TABLET, ORALLY DISINTEGRATING ORAL at 16:23

## 2022-12-31 RX ADMIN — ATENOLOL 50 MG: 50 TABLET ORAL at 02:53

## 2022-12-31 RX ADMIN — NICOTINE POLACRILEX 2 MG: 2 GUM, CHEWING BUCCAL at 19:29

## 2022-12-31 RX ADMIN — NICOTINE POLACRILEX 2 MG: 2 GUM, CHEWING BUCCAL at 21:54

## 2022-12-31 ASSESSMENT — ACTIVITIES OF DAILY LIVING (ADL)
LAUNDRY: WITH SUPERVISION
ADLS_ACUITY_SCORE: 28
ADLS_ACUITY_SCORE: 28
ORAL_HYGIENE: INDEPENDENT
ORAL_HYGIENE: INDEPENDENT
ADLS_ACUITY_SCORE: 28
ADLS_ACUITY_SCORE: 28
DRESS: SCRUBS (BEHAVIORAL HEALTH)
ADLS_ACUITY_SCORE: 28
HYGIENE/GROOMING: INDEPENDENT
LAUNDRY: WITH SUPERVISION
DRESS: SCRUBS (BEHAVIORAL HEALTH)
ADLS_ACUITY_SCORE: 28
HYGIENE/GROOMING: INDEPENDENT

## 2022-12-31 NOTE — ED NOTES
Pt assigned to 3A, writer called for updates. Unclear on timeline for admission, 3A will call when they are ready.

## 2022-12-31 NOTE — PLAN OF CARE
"Goal Outcome Evaluation:    Plan of Care Reviewed With: patient Plan of Care Reviewed With: patient    Overall Patient Progress: improvingOverall Patient Progress: improving    Outcome Evaluation: Pt in moderate alcohol withdrawal requiring valium to treat.    MSSA scores today are 11 and 6, 10 mg po valium administered x 1. Total valium administered since arrival to the hospital = 50mg (all on 3A), pt also received 6mg ativan in the ER prior to transfer. Pt has a mild-moderate tremor and appetite is about 50% with meals. Pt initially hesitant to take valium stating he does not like the way it makes him feel. Otherwise he denies any hallucination. Explained to him the seriousness of alcohol withdrawal and that staff would not bring him medications if his symptoms did not meet the threshold to treat. Pt states this is his first detox. Explained to him the usual detox process and the unit schedule. Pt exhibits poor insight into the disease process of alcohol and is already talking about when he can discharge. Encouraged pt to at least consider some form of treatment once detox is complete.     Pt reports anxiety 8 of 10 in severity. Denies depression when asked directly then states \"how good of a mood can you be\". Pt denies suicidal ideation plans or intent. Denies ever attempting suicide. States willingness to alert staff if he develops any thoughts of self harm/suicide (contracts for safety).    Pt reports psoriasis to his \"shins and elbows\". He does appear jaundiced especially when looking at his sclerae. Otherwise denies any skin conditions/problems.     Will continue to monitor and intervene as warranted.   "

## 2022-12-31 NOTE — H&P
"[facility] [place]   Psychiatric History and Physical  Admission date: 12/30/2022        Chief Complaint:   \"I feel a lot better now.\"         HPI:     The patient is a 41yo male with a history of alcohol use disorder and anxiety who was admitted to detoxify from alcohol. Was endorsing SI and family and GF have been concerned for his safety. Says that he had \"minor suicidal thoughts\" and denies any currently. Feels safe here in the hospital. Patient is quite tremulous. Does endorse anxiety but denies depression. Reports good sleep. Says that his nausea has decreased. Discussed options for alcohol cravings and deterrence and patient declines. Does feel fatigued and says \"Valium makes me feel like I weight 10 times as much.\" Is open to CD treatment.      Per ER:  Jonh Ritter is a 42 year old male who presents with his girlfriend for evaluation of his alcohol use and suicidal thoughts.  He states he is an alcoholic and has been drinking heavily for quite some time, though increased use over the last few days.  He has been feeling more worthless and has been making some suicidal thoughts, though he states he does not intend to harm himself.  He states that he asked his best friend to remove all the firearms from his residence.  His girlfriend is concerned about his mental health as well as his excessive alcohol use.  Patient states he has never been to detox or treatment for his alcohol use, and is not sure whether he wants to go there today either, citing concerns that he might lose his job.     I personally saw the patient earlier in the year under similar circumstances, detox was arranged the patient then changed his mind and ultimately left the emergency department.  More recently he was seen here in September again for alcohol related issues, was prescribed Librium.     The patient states that he specifically would like to have IV fluids and IV Valium, stating that oral Valium has " not made him feel good in the past.        Past Psychiatric History:     No medications. Passive SI recently. Denies history of suicide attempts. Patient does not have any established outpatient providers, no past psychiatric admissions or commitments.         Substance Use and History:     Alcohol use disorder. Denies withdrawal seizures or DTs. PPD smoker.         Past Medical History:   PAST MEDICAL HISTORY:   Past Medical History:   Diagnosis Date     Alcohol abuse      CARDIOVASCULAR SCREENING; LDL GOAL LESS THAN 160      Psoriasis        PAST SURGICAL HISTORY:   Past Surgical History:   Procedure Laterality Date     SURGICAL HISTORY OF -   1987    Rt Inguinal Hernia Repair             Family History:   FAMILY HISTORY: Father with alcohol use disorder.   Family History   Problem Relation Age of Onset     Family History Negative Other            Social History:   Please see the full psychosocial profile from the clinical treatment coordinator.   SOCIAL HISTORY:   Social History     Tobacco Use     Smoking status: Every Day     Packs/day: 1.00     Types: Cigarettes     Smokeless tobacco: Never   Substance Use Topics     Alcohol use: Yes     Alcohol/week: 7.0 standard drinks     Types: 7 Standard drinks or equivalent per week            Physical ROS:   The patient endorsed tremors. The remainder of 10-point review of systems was negative except as noted in HPI.         PTA Medications:     Medications Prior to Admission   Medication Sig Dispense Refill Last Dose     ibuprofen (ADVIL/MOTRIN) 200 MG tablet Take 200 mg by mouth every 4 hours as needed for pain   Past Week     triamcinolone (KENALOG) 0.1 % external cream Apply sparingly to affected area three times daily as needed 240 g 3 More than a month          Allergies:     Allergies   Allergen Reactions     Penicillins      As infant          Labs:     Recent Results (from the past 48 hour(s))   Asymptomatic COVID-19 Virus (Coronavirus) by PCR Nasopharyngeal     Collection Time: 12/29/22  5:11 AM    Specimen: Nasopharyngeal; Swab   Result Value Ref Range    SARS CoV2 PCR Negative Negative   Alcohol breath test POCT    Collection Time: 12/30/22  1:13 PM   Result Value Ref Range    Alcohol Breath Test 0.251 (A) 0.00 - 0.01   Comprehensive metabolic panel    Collection Time: 12/30/22  1:43 PM   Result Value Ref Range    Sodium 141 133 - 144 mmol/L    Potassium 4.1 3.4 - 5.3 mmol/L    Chloride 103 94 - 109 mmol/L    Carbon Dioxide (CO2) 31 20 - 32 mmol/L    Anion Gap 7 3 - 14 mmol/L    Urea Nitrogen 4 (L) 7 - 30 mg/dL    Creatinine 0.86 0.66 - 1.25 mg/dL    Calcium 9.7 8.5 - 10.1 mg/dL    Glucose 106 (H) 70 - 99 mg/dL    Alkaline Phosphatase 89 40 - 150 U/L    AST 87 (H) 0 - 45 U/L    ALT 60 0 - 70 U/L    Protein Total 8.1 6.8 - 8.8 g/dL    Albumin 4.3 3.4 - 5.0 g/dL    Bilirubin Total 1.9 (H) 0.2 - 1.3 mg/dL    GFR Estimate >90 >60 mL/min/1.73m2   Lipase    Collection Time: 12/30/22  1:43 PM   Result Value Ref Range    Lipase 120 73 - 393 U/L   Ethyl Alcohol Level    Collection Time: 12/30/22  1:43 PM   Result Value Ref Range    Alcohol ethyl 0.29 (H) <=0.01 g/dL   CBC with platelets and differential    Collection Time: 12/30/22  1:43 PM   Result Value Ref Range    WBC Count 5.6 4.0 - 11.0 10e3/uL    RBC Count 4.62 4.40 - 5.90 10e6/uL    Hemoglobin 16.6 13.3 - 17.7 g/dL    Hematocrit 46.5 40.0 - 53.0 %     (H) 78 - 100 fL    MCH 35.9 (H) 26.5 - 33.0 pg    MCHC 35.7 31.5 - 36.5 g/dL    RDW 12.2 10.0 - 15.0 %    Platelet Count 242 150 - 450 10e3/uL    % Neutrophils 67 %    % Lymphocytes 22 %    % Monocytes 9 %    % Eosinophils 1 %    % Basophils 1 %    % Immature Granulocytes 0 %    NRBCs per 100 WBC 0 <1 /100    Absolute Neutrophils 3.7 1.6 - 8.3 10e3/uL    Absolute Lymphocytes 1.2 0.8 - 5.3 10e3/uL    Absolute Monocytes 0.5 0.0 - 1.3 10e3/uL    Absolute Eosinophils 0.1 0.0 - 0.7 10e3/uL    Absolute Basophils 0.1 0.0 - 0.2 10e3/uL    Absolute Immature Granulocytes 0.0  "<=0.4 10e3/uL    Absolute NRBCs 0.0 10e3/uL   Asymptomatic Influenza A/B & SARS-CoV2 (COVID-19) Virus PCR Multiplex Nasopharyngeal    Collection Time: 12/30/22  3:22 PM    Specimen: Nasopharyngeal; Swab   Result Value Ref Range    Influenza A PCR Negative Negative    Influenza B PCR Negative Negative    RSV PCR Negative Negative    SARS CoV2 PCR Negative Negative   Drug abuse screen 1 urine (ED)    Collection Time: 12/30/22  7:46 PM   Result Value Ref Range    Amphetamines Urine Screen Negative Screen Negative    Barbiturates Urine Screen Negative Screen Negative    Benzodiazepines Urine Screen Negative Screen Negative    Cannabinoids Urine Screen Negative Screen Negative    Cocaine Urine Screen Negative Screen Negative    Opiates Urine Screen Negative Screen Negative          Physical and Psychiatric Examination:     /80   Pulse 120   Temp 98.4  F (36.9  C) (Temporal)   Resp 16   Ht 1.89 m (6' 2.4\")   Wt 72.6 kg (160 lb)   SpO2 96%   BMI 20.32 kg/m    Weight is 160 lbs 0 oz  Body mass index is 20.32 kg/m .    Physical Exam:  I have reviewed the physical exam as documented by the medical team and agree with findings and assessment and have no additional findings to add at this time.    Mental Status Exam:  Appearance: awake, alert and adequately groomed  Attitude:  cooperative  Eye Contact:  good  Mood:  anxious  Affect:  mood congruent  Speech:  dysarthria due to tremor  Language: fluent and intact in English  Psychomotor, Gait, Musculoskeletal:  tremor observed   Thought Process:  goal oriented  Associations:  no loose associations  Thought Content:  no evidence of suicidal ideation or homicidal ideation and no evidence of psychotic thought  Insight:  fair  Judgement:  fair  Oriented to:  time, person, and place  Attention Span and Concentration:  intact  Recent and Remote Memory:  fair  Fund of Knowledge:  appropriate         Admission Diagnoses:   Alcohol dependence with intoxication with " complication (H)  Substance-induced depression versus MDD  Nicotine use disorder         Assessment & Plan:     1) MSSA with Valium.   2) Nicotine patch and gum available.   3) Patient declined the offer of medications for alcohol cravings.   4) Patient is open to CD treatment.   5) Need to have collateral information if patient requests to leave due to recent SI.     Disposition Plan   Reason for ongoing admission: requires detoxification from substance that poses a risk of bodily harm during withdrawal period  Discharge location: Chemical dependency treatment facility  Discharge Medications: not ordered  Follow-up Appointments: not scheduled  Legal Status: voluntary    Entered by: George Perez MD on 12/31/2022 at 4:44 AM

## 2022-12-31 NOTE — PLAN OF CARE
"  Problem: Alcohol Withdrawal  Goal: Alcohol Withdrawal Symptom Control  Outcome: Progressing   3AW Admission Note    S = Situation:   Jonh Ritter is a 42 year old year old male with a chief complaint of Alcohol problem.   Voluntary admission to [facility] for Alcohol withdrawal and detox.    B  = Background:   Substance use history: Alcohol   Mental health history: None   Medical history: Psoriasis   Legal history: None   Treatment history: None   Living situation: Alone   Recent life stressors: None      A  =  Assessment:   During admission interview, pt affect was blunted and flat. Calm with shaky and tremors. Patient stated he drinks Jagermeister about 20 shots a day with Red bull. His last drink was few hours before his admission. He smokes a pack of cigarette a day. No other chemical used. Endorsed Anxiety 9/10, depression 0/10. Denies SI/HI/SIB and other psych symptoms. Patient contracted for safety. MSSA score of 12 on assessment, 10 mg of valium given. PRN tums given for heartburn per patient requested. 4 mg Zofran given for N/V and Melatonin given for sleep. Denies history of withdrawal seizures and DT's.     /73 (BP Location: Left arm, Patient Position: Sitting, Cuff Size: Adult Regular)   Pulse (!) 122   Temp 98.6  F (37  C) (Oral)   Resp 16   Ht 1.89 m (6' 2.4\")   Wt 72.6 kg (160 lb)   SpO2 96%   BMI 20.32 kg/m       R =   Request or Recommendation:   Alcohol withdrawal will be monitored and treated using MSSA with valium.   Pt will meet with psychiatry, internal medicine, and case management tomorrow.  At the time of admission, pt reports discharge plan is go to treatment.                        "

## 2022-12-31 NOTE — PROGRESS NOTES
12/30/22 2046   Patient Belongings   Did you bring any home meds/supplements to the hospital?  No   Patient Belongings locker;other (see comments)   Patient Belongings Put in Hospital Secure Location (Security or Locker, etc.) other (see comments)   Belongings Search Yes   Clothing Search Yes   Second Staff Clifton OVIEDO   Comment see note     Storage Bin: shoes w/laces, jacket, hoodie w/string, backpack has toothbrush, deodorant, Jonathan, colgate, apple watch , Ipad.   Medical Bin: 2x cigarettes, cell phone, 2x lighters   A             Admission:  I am responsible for any personal items that are not sent to the safe or pharmacy.  [facility] is not responsible for loss, theft or damage of any property in my possession.  Signature:  _________________________________ Date: _______  Time: _____                                              Staff Signature:  ____________________________ Date: ________  Time: _____      2nd Staff person, if patient is unable/unwilling to sign:    Signature: ________________________________ Date: ________  Time: _____   Discharge:  [facility] has returned all of my personal belongings:  Signature: _________________________________ Date: ________  Time: _____                                          Staff Signature:  ____________________________ Date: ________  Time: _____

## 2022-12-31 NOTE — PLAN OF CARE
"Goal Outcome Evaluation:    Problem: Alcohol Withdrawal  Goal: Alcohol Withdrawal Symptom Control  Outcome: Progressing     Patient appeared to sleep for 5.5 hours.    MSSA's were 12, 14 and 9; patient was given a total of 30mg of valium. Atenolol 50mg was administered for his increased HR of 120. Patient endorsed nausea earlier during the shift, but it was too early to give zofran. When reassessed around 5am, patient informed staff that he would like zofran administered before breakfast.    /71   Pulse 99   Temp 97.6  F (36.4  C) (Temporal)   Resp 16   Ht 1.89 m (6' 2.4\")   Wt 72.6 kg (160 lb)   SpO2 96%   BMI 20.32 kg/m      We'll continue to monitor.   "

## 2022-12-31 NOTE — CONSULTS
Consult Date: 12/31/2022    HISTORY OF PRESENT ILLNESS:  The patient is a 42-year-old male admitted to station 3A for alcohol abuse and detoxification.  Drinking a heavy amount of hard liquor prior to admission with blood alcohol level initially 0.33.  An internal medicine consultation was ordered by Dr. Link to assess medical problems, including nausea and psoriasis.  At this time, Jonh admits to nausea, but denies vomiting.  Denies fever, chills, chest pain, shortness of breath, bowel and bladder concerns other than mild diarrhea.  Denies melena.    PAST MEDICAL HISTORY:    1.  Alcohol use disorder and other psychiatric history per Dr. Link.  2.  Psoriasis.  3.  Status post right inguinal herniorrhaphy.  4.  Denies history of other chronic medical problems and surgeries.    MEDICATIONS:  Reviewed and listed in medication reconciliation list.    ALLERGIES:  PENICILLIN.  Social History     Socioeconomic History    Marital status: Single     Spouse name: Not on file    Number of children: 0    Years of education: 12    Highest education level: Not on file   Occupational History     Comment: [employer]   Tobacco Use    Smoking status: Every Day     Packs/day: 1.00     Types: Cigarettes    Smokeless tobacco: Never   Substance and Sexual Activity    Alcohol use: Yes     Alcohol/week: 7.0 standard drinks     Types: 7 Standard drinks or equivalent per week    Drug use: No    Sexual activity: Never   Other Topics Concern    Parent/sibling w/ CABG, MI or angioplasty before 65F 55M? No     Service Not Asked    Blood Transfusions Not Asked    Caffeine Concern Yes     Comment: 1-2 cups daily    Occupational Exposure Not Asked    Hobby Hazards Not Asked    Sleep Concern Not Asked    Stress Concern Not Asked    Weight Concern Not Asked    Special Diet Not Asked    Back Care Not Asked    Exercise Yes     Comment: active at work    Bike Helmet Not Asked    Seat Belt Yes    Self-Exams Not Asked   Social  History Narrative    Not on file     Social Determinants of Health     Financial Resource Strain: Not on file   Food Insecurity: Not on file   Transportation Needs: Not on file   Physical Activity: Not on file   Stress: Not on file   Social Connections: Not on file   Intimate Partner Violence: Not on file   Housing Stability: Not on file        Family History   Problem Relation Age of Onset    Family History Negative Other         REVIEW OF SYSTEMS:  A 10-point review of systems is negative except for stated above in history of present illness.    PHYSICAL EXAMINATION:    GENERAL:  Nontoxic-appearing young man in no acute distress.  VITAL SIGNS:  Stable, temperature afebrile, pulse in the 80s, blood pressure 115/73.  LUNGS:  Clear.  CARDIOVASCULAR:  Regular rate and rhythm.  ABDOMEN:  Soft, nontender.  EXTREMITIES:  No edema.  SKIN:  No rashes on exposed areas.    LABORATORY DATA:  Repeat comprehensive medical panel revealed a mildly elevated total bilirubin of 1.9 and AST of 87.  Otherwise, second set of CBC and comprehensive metabolic panel unremarkable.    IMPRESSION:    1.  Alcohol abuse and withdrawal per Dr. Link.  2.  Mild nausea, most likely secondary to alcohol withdrawal, stable.  3.  Psoriasis, mainly on elbows, normally controlled on prior to admission topical triamcinolone.  4.  Mild hyperbilirubinemia and abnormal AST on this morning's repeat labs, most likely secondary to his heavy alcohol abuse prior to admission and expect to resolve as patient continues to detoxify and abstain from future alcohol use.    PLAN:  Continue with prior to admission scheduled topical triamcinolone b.i.d. for his psoriasis as well as already ordered as-needed Zofran for his nausea.  Monitor for improvement.  He should be instructed to follow up with his family physician after discharge within 1-2 weeks for hospital followup and repeat labs including liver function testing.  The patient is medically stable and medicine  signing off.  Please feel free to call if questions.    Thank you for this consultation.    CAYETANO REARDON PA-C        D: 2022   T: 2022   MT: ROBERTO    Name:     PHU FAYSuman  MRN:      [id number]        Account:      [id number]   :      1980           Consult Date: 2022     Document: [id number]

## 2022-12-31 NOTE — DISCHARGE INSTRUCTIONS
Behavioral Discharge Planning and Instructions  THANK YOU FOR CHOOSING [address]  [phone]    Summary: You were admitted to Station 3A on 12/30/2022 for detoxification from alcohol.  A medical exam was performed that included lab work. You have met with a  and opted to attending IOP at [facility] located at [address] [place] [address]  [address], [place] [address] on 1/4/2022 at 1pm. Please call [facility] at [phone] if you have any questions or concerns.  Please take care and make your recovery a daily priority, Jonh!  It was a pleasure working with you and the entire treatment team here wishes you the very best in your recovery!     Recommendation:  Attend IOP (VIRTUAL) appointment on 1/04/22 at [facility] at 1pm.     Main Diagnoses:  Per Dr. 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 You did not complete a chemical dependency assessment. You had labs drawn and those results were reviewed with you. Please take a copy of your lab work with you to your next primary care provider appointment.    Symptoms to Report:  If you experience more anxiety, confusion, sleeplessness, deep sadness or thoughts of suicide, notify your treatment team or notify your primary care provider. IF ANY OF THE SYMPTOMS YOU ARE EXPERIENCING ARE A MEDICAL EMERGENCY CALL 911 IMMEDIATELY.     Lifestyle Adjustment: Adjust your lifestyle to get enough sleep, relaxation, exercise and good nutrition. Continue to develop healthy coping skills to decrease stress and promote a sober living environment. Do not use mood altering substances including alcohol, illegal drugs or addictive medications other than what is currently prescribed.     Disposition: Return  home and complete admissions for IOP at [facility] on 1/04/22 at 1pm.     Facts about COVID19 at www.cdc.gov/COVID19 and www.[place].gov/covid19    Keeping hands clean is one of the most important steps we can take to avoid getting sick and spreading germs to others.  Please wash your hands frequently and lather with soap for at least 20 seconds!    Follow-up Appointment:   You declined to set up a follow up appointment prior to discharge stating you have not established care after your insurance changed to [employer]. Please be seen and establish care within 3 weeks of discharge.  [facility] [place]  [address], [place] [address] [phone]    Recovery apps for your phone to locate current in person and zoom recovery meetings  [place] - meeting moris  AA  - meeting moris  Meeting guide - meeting moris  Quick NA meeting - meeting moris  Lars- has various apps    Resources:  *due to covid-19 most AA/NA meetings are being held online however some are in-person or a hybrid combination please check online to verify*  Need Support Now? If you or someone you know is struggling or in crisis, help is available. Call or text 988 or chat BIBA Apparels.org  AA meetings search for them at: https://aa-intergroup.org (worldwide meeting listings)  AA meetings for [place] area can be found online at: https://a[place].org (click local online meetings listings)  NA meetings for [place] area can be found online at: https://www.nam[place].org  (click find a meeting)  AA and NA Sponsors are excellent resources for support and you can find one at any support group meeting.   Alcoholics Anonymous (https://aa.org/): for information 24 hours/day  AA Intergroup service office in [place] (http://www.aastpa[place].org/) 203.141.4469  AA Intergroup service office in [facility]: [phone]. (http://www.a[place].org/)  Narcotics Anonymous (www.nam[place].org) (784)  432-2990  https://aafairviewriverside.org/meetings  SMART Recovery - self management for addiction recovery:  www.smartrecovery.org  Pathways ~ A Health Crisis Resource & Support Center:  492.549.8642.  https://prescribetoprevent.org/patient-education/videos/  http://www.harmreduction.org  [facility] 482-501-3239  Support Group:  AA/NA and Sponsor/support.  National [place] on Mental Illness (www.[place].shahid.org): 285.470.9807 or 922-051-1702.  Alcoholics Anonymous (www.alcoholics-anonymous.org): Check your phone book for your local chapter.  Suicide Awareness Voices of Education (SAVE) (www.save.org): 918-048-INQZ (9106)  National Suicide Prevention Line (www.mentalhealthmn.org): 729-924-RHPU (6747)  Mental Health Consumer/Survivor Network of [place] (www.mhcsn.net): 571.361.2831 or 047-682-9341  Mental Health Association of [place] (www.mentalhealth.org): 441.670.2357 or 103-250-2577   Substance Abuse and Mental Health Services (www.samhsa.gov)  [place] Opioid Prevention Coalition: www.opioidcoalition.org    [place] Recovery Connection (MRC)  [facility] connects people seeking recovery to resources that help foster and sustain long-term recovery.  Whether you are seeking resources for treatment, transportation, housing, job training, education, health care or other pathways to recovery, [facility] is a great place to start.  146.982.3671.  www.[place]Stepsss.org    Great Pod casts for nutrition and wellness  Listen on Apple Podcasts  Dishing Up Nutrition   [employer]   Nutrition       Understand the connection between what you eat and how you feel. Hosted by licensed nutritionists and dietitians from [employer] we share practical, real-life solutions for healthier living through nutrition.     General Medication Instructions:   See your medication sheet(s) for instructions.   Take all medications as prescribed.  Make no changes unless your primary care provider suggests  them.   Go to all your primary care provider visits.  Be sure to have all your required lab tests. This way, your medicines can be refilled on time.  Do not use any forms of alcohol.    Please Note:  If you have any questions at anytime after you are discharged please call [facility] detox unit 3AW at [phone].  [facility], Behavioral Intake [phone]  Medical Records call [phone]  Outpatient Behavioral Intake call [phone]  LP+ Wait List/Bed Availability call [phone]    Please remember to take all of your behavioral discharge planning and lab paperwork to any follow up appointments, it contains your lab results, diagnosis, medication list and discharge recommendations.      THANK YOU FOR CHOOSING [facility]

## 2022-12-31 NOTE — PHARMACY-ADMISSION MEDICATION HISTORY
Admission medication history interview status for the 12/30/2022 admission is complete. See EPIC admission navigator for allergy information, pharmacy, prior to admission medications and immunization status.     Medication history interview sources:  significant other, fill history    Changes made to PTA medication list (reason)  Added: ibuprofen OTC  Deleted: Librium  Changed: none    Additional medication history information (including reliability of information, actions taken by pharmacist):None    Medication history completed by: Vickie Hawley, GuilleD, MPH, MS      Prior to Admission medications    Medication Sig Last Dose Taking? Auth Provider Long Term End Date   ibuprofen (ADVIL/MOTRIN) 200 MG tablet Take 200 mg by mouth every 4 hours as needed for pain Past Week Yes Unknown, Entered By History     triamcinolone (KENALOG) 0.1 % external cream Apply sparingly to affected area three times daily as needed More than a month Yes Reid Garcia MD

## 2023-01-01 PROCEDURE — 128N000001 HC R&B CD/MH ADULT

## 2023-01-01 PROCEDURE — 250N000013 HC RX MED GY IP 250 OP 250 PS 637: Performed by: PSYCHIATRY & NEUROLOGY

## 2023-01-01 PROCEDURE — 250N000011 HC RX IP 250 OP 636: Performed by: PHYSICIAN ASSISTANT

## 2023-01-01 PROCEDURE — 250N000013 HC RX MED GY IP 250 OP 250 PS 637: Performed by: FAMILY MEDICINE

## 2023-01-01 PROCEDURE — 128N000004 HC R&B CD ADULT

## 2023-01-01 RX ADMIN — NICOTINE POLACRILEX 2 MG: 2 GUM, CHEWING BUCCAL at 20:19

## 2023-01-01 RX ADMIN — FOLIC ACID 1 MG: 1 TABLET ORAL at 09:09

## 2023-01-01 RX ADMIN — NICOTINE POLACRILEX 2 MG: 2 GUM, CHEWING BUCCAL at 16:34

## 2023-01-01 RX ADMIN — CALCIUM CARBONATE (ANTACID) CHEW TAB 500 MG 500 MG: 500 CHEW TAB at 14:52

## 2023-01-01 RX ADMIN — NICOTINE 1 PATCH: 21 PATCH, EXTENDED RELEASE TRANSDERMAL at 09:09

## 2023-01-01 RX ADMIN — HYDROXYZINE HYDROCHLORIDE 25 MG: 25 TABLET, FILM COATED ORAL at 01:00

## 2023-01-01 RX ADMIN — MULTIPLE VITAMINS W/ MINERALS TAB 1 TABLET: TAB at 09:09

## 2023-01-01 RX ADMIN — ONDANSETRON 4 MG: 4 TABLET, ORALLY DISINTEGRATING ORAL at 05:25

## 2023-01-01 RX ADMIN — NICOTINE POLACRILEX 2 MG: 2 GUM, CHEWING BUCCAL at 14:52

## 2023-01-01 RX ADMIN — THIAMINE HCL TAB 100 MG 100 MG: 100 TAB at 09:09

## 2023-01-01 RX ADMIN — TRIAMCINOLONE ACETONIDE: 1 CREAM TOPICAL at 20:19

## 2023-01-01 RX ADMIN — MELATONIN TAB 3 MG 3 MG: 3 TAB at 01:00

## 2023-01-01 RX ADMIN — CALCIUM CARBONATE (ANTACID) CHEW TAB 500 MG 500 MG: 500 CHEW TAB at 20:19

## 2023-01-01 ASSESSMENT — ACTIVITIES OF DAILY LIVING (ADL)
ADLS_ACUITY_SCORE: 28
DRESS: SCRUBS (BEHAVIORAL HEALTH)
ADLS_ACUITY_SCORE: 28
HYGIENE/GROOMING: INDEPENDENT
ADLS_ACUITY_SCORE: 28
ADLS_ACUITY_SCORE: 28
ORAL_HYGIENE: INDEPENDENT
ADLS_ACUITY_SCORE: 28
LAUNDRY: WITH SUPERVISION

## 2023-01-01 NOTE — PROGRESS NOTES
Case Management:     Writer checked-in and introduced role to pt's care and how to assist pt during their stay. Inquired with pt about what they are needing during their stay and what they are considering for their aftercare plans. During both check ins pt was in bed. Pt was some what awake/engaged. Pt processed that this is his first time to detox and has never engaged in therapy or treatment. Processed with pt the various levels of care. Encouraged pt to complete the assessment paper work noting having that assessment can help with understanding pt's use and recommend aftercare based on the assessment. Pt shared that he needs to talk with his girlfriend but was understanding. Informed pt if he has further questions CM will continue to follow up with him and assist with discharge planning.

## 2023-01-01 NOTE — PLAN OF CARE
"Goal Outcome Evaluation:    Problem: Alcohol Withdrawal  Goal: Alcohol Withdrawal Symptom Control  Outcome: Progressing     Patient appeared to be sleeping throughout the night.    MSSA's were 6 and 5. Patient endorsed nausea and some mild sweats. Zofran 4mg ODT was administered.    /67   Pulse 62   Temp 97.4  F (36.3  C) (Temporal)   Resp 16   Ht 1.89 m (6' 2.4\")   Wt 72.6 kg (160 lb)   SpO2 96%   BMI 20.32 kg/m        We'll continue to monitor.    "

## 2023-01-01 NOTE — PLAN OF CARE
"Goal Outcome Evaluation:    Plan of Care Reviewed With: patient Plan of Care Reviewed With: patient    Overall Patient Progress: improvingOverall Patient Progress: improving    Outcome Evaluation: Pt remains in alcohol withdrawal monitoring, no medication required to treat at this time.    MSSA scores today are  5 and 6, no valium indicated. Pt is not shaky/sweaty, denies hallucinations and appetite is fair to good. Total valium since arrival to the hospital = 70mg (all on 3A), pt also received 6mg ativan in the ER prior to transfer.     Pt isolative to his room and was encouraged to come to the lounge to eat breakfast. Pt reports feeling happiness and hopefulness \"based on the fact that I slept so well\". States this was the first time he's been able to get such a good night of rest. He denies suicidal ideation plans or intent. Denies depression. Contracts for safety (willing to alert staff if he feels like harming self). Mainly isolative to his room most of the shift despite being encouraged to come out of his room.     Pt would benefit of spending more time today out of his room and in the lounge with his peers. Recommend encouraging independence and participation rather than staying in his room the entire shift. Will continue to monitor and intervene as warranted.     "

## 2023-01-01 NOTE — PLAN OF CARE
"  Problem: Alcohol Withdrawal  Goal: Alcohol Withdrawal Symptom Control  Outcome: Progressing   Goal Outcome Evaluation:    Plan of Care Reviewed With: patient      Patient alert and oriented with flat and blunted affect. Isolated to self in his room but was out for meals. Patient endorsed Anxiety of 9/10, Depression of 5/10 and Denies HI/SI/SIB and other psych symptoms. Contacted for safety. Patient reported having 50% of his dinner and adequate fluid intake. MSSA scores of 11 and 8, PRN valium 10 mg x2 was given at this shift.  Patient c/o Nauseous, prn Zofarn was given.   /67 (BP Location: Left arm)   Pulse 85   Temp 97.7  F (36.5  C) (Temporal)   Resp 16   Ht 1.89 m (6' 2.4\")   Wt 72.6 kg (160 lb)   SpO2 98%   BMI 20.32 kg/m                  "

## 2023-01-02 VITALS
HEIGHT: 74 IN | HEART RATE: 114 BPM | BODY MASS INDEX: 20.53 KG/M2 | RESPIRATION RATE: 16 BRPM | TEMPERATURE: 97.3 F | SYSTOLIC BLOOD PRESSURE: 122 MMHG | DIASTOLIC BLOOD PRESSURE: 79 MMHG | OXYGEN SATURATION: 96 % | WEIGHT: 160 LBS

## 2023-01-02 LAB
ALBUMIN SERPL-MCNC: 3.4 G/DL (ref 3.4–5)
ALP SERPL-CCNC: 94 U/L (ref 40–150)
ALT SERPL W P-5'-P-CCNC: 47 U/L (ref 0–70)
AST SERPL W P-5'-P-CCNC: 40 U/L (ref 0–45)
BILIRUB DIRECT SERPL-MCNC: 0.4 MG/DL (ref 0–0.2)
BILIRUB SERPL-MCNC: 1.9 MG/DL (ref 0.2–1.3)
HOLD SPECIMEN: NORMAL
PROT SERPL-MCNC: 7 G/DL (ref 6.8–8.8)

## 2023-01-02 PROCEDURE — 99239 HOSP IP/OBS DSCHRG MGMT >30: CPT | Performed by: PSYCHIATRY & NEUROLOGY

## 2023-01-02 PROCEDURE — 250N000013 HC RX MED GY IP 250 OP 250 PS 637: Performed by: PSYCHIATRY & NEUROLOGY

## 2023-01-02 PROCEDURE — 82247 BILIRUBIN TOTAL: CPT | Performed by: PSYCHIATRY & NEUROLOGY

## 2023-01-02 PROCEDURE — 250N000013 HC RX MED GY IP 250 OP 250 PS 637: Performed by: FAMILY MEDICINE

## 2023-01-02 PROCEDURE — 36415 COLL VENOUS BLD VENIPUNCTURE: CPT | Performed by: PSYCHIATRY & NEUROLOGY

## 2023-01-02 RX ORDER — LANOLIN ALCOHOL/MO/W.PET/CERES
100 CREAM (GRAM) TOPICAL DAILY
Qty: 30 TABLET | Refills: 0 | Status: SHIPPED | OUTPATIENT
Start: 2023-01-03

## 2023-01-02 RX ORDER — MULTIPLE VITAMINS W/ MINERALS TAB 9MG-400MCG
1 TAB ORAL DAILY
Qty: 30 TABLET | Refills: 0 | Status: SHIPPED | OUTPATIENT
Start: 2023-01-03

## 2023-01-02 RX ADMIN — FOLIC ACID 1 MG: 1 TABLET ORAL at 09:11

## 2023-01-02 RX ADMIN — NICOTINE 1 PATCH: 21 PATCH, EXTENDED RELEASE TRANSDERMAL at 09:11

## 2023-01-02 RX ADMIN — THIAMINE HCL TAB 100 MG 100 MG: 100 TAB at 09:11

## 2023-01-02 RX ADMIN — MULTIPLE VITAMINS W/ MINERALS TAB 1 TABLET: TAB at 09:11

## 2023-01-02 RX ADMIN — IBUPROFEN 200 MG: 200 TABLET, FILM COATED ORAL at 07:14

## 2023-01-02 ASSESSMENT — ACTIVITIES OF DAILY LIVING (ADL)
ADLS_ACUITY_SCORE: 28
LAUNDRY: WITH SUPERVISION
ADLS_ACUITY_SCORE: 28
DRESS: STREET CLOTHES
ADLS_ACUITY_SCORE: 28
ADLS_ACUITY_SCORE: 28
ORAL_HYGIENE: INDEPENDENT
HYGIENE/GROOMING: INDEPENDENT

## 2023-01-02 NOTE — PROGRESS NOTES
Behavioral Team Discussion: (1/2/2023)    Continued Stay Criteria/Rationale: Patient admitted for Alcohol  Use Disorder.  Plan: The following services will be provided to the patient; psychiatric assessment, medication management, therapeutic milieu, individual and group support, and skills groups.   Participants: 3A Provider: Dr. Moises Link MD; 3A RN: Jarrell Rea, RN; 3A CM's: Sheeba Betancourt.  Summary/Recommendation: Providers will assess today for treatment recommendations, discharge planning, and aftercare plans. CM will meet with pt for discharge planning.   Medical/Physical: Patient reports suicidal ideation with no plan.   Precautions:   Behavioral Orders   Procedures     Code 1 - Restrict to Unit     Discontinue 1:1 attendant for suicide risk     Order Specific Question:   I have performed an in person assessment of the patient     Answer:   Based on this assessment the patient no longer requires a one on one attendant at this point in time.     Order Specific Question:   Rationale     Answer:   Patient States able to remain safe in hospital     Discontinue 1:1 attendant for suicide risk     Order Specific Question:   I have performed an in person assessment of the patient     Answer:   Based on this assessment the patient no longer requires a one on one attendant at this point in time.     Order Specific Question:   Rationale     Answer:   Patient States able to remain safe in hospital     Order Specific Question:   Rationale     Answer:   Modifications to care environment made to mitigate safety risk     Order Specific Question:   Rationale     Answer:   Routine observations are sufficient to monitor safety.     Routine Programming     As clinically indicated     Status 15     Every 15 minutes.     Withdrawal precautions     Rationale for change in precautions or plan: N/A  Progress: Initial.    ASAM Dimension Scale Ratings:  Dimension 1: 3 Client tolerates and hay with withdrawal discomfort  poorly. Client has severe intoxication, such that the client endangers self or others, or intoxication has not abated with less intensive levels of services. Client displays severe signs and symptoms; or risk of severe, but manageable withdrawal; or withdrawal worsening despite detox at less intensive level.  Dimension 2: 1 Client tolerates and hay with physical discomfort and is able to get the services that the client needs.  Dimension 3: 2 Client has difficulty with impulse control and lacks coping skills. Client has thoughts of suicide or harm to others without means; however, the thoughts may interfere with participation in some treatment activities. Client has difficulty functioning in significant life areas. Client has moderate symptoms of emotional, behavioral, or cognitive problems. Client is able to participate in most treatment activities.  Dimension 4: 3 Client displays inconsistent compliance, minimal awareness of either the client's addiction or mental disorder, and is minimally cooperative.  Dimension 5: 3 Client has poor recognition and understanding of relapse and recidivism issues and displays moderately high vulnerability for further substance use or mental health problems. Client has few coping skills and rarely applies coping skills.  Dimension 6: 3 Client is not engaged in structured, meaningful activity and the client's peers, family, significant other, and living environment are unsupportive, or there is significant criminal justice system involvement.  PT SEEN   AGREE WITH ASSESSMENT AND PLAN

## 2023-01-02 NOTE — PLAN OF CARE
Goal Outcome Evaluation:    Problem: Plan of Care - These are the overarching goals to be used throughout the patient stay.    Goal: Optimal Comfort and Wellbeing  Outcome: Progressing     Patient appeared to be sleeping throughout the night. No concerns voiced.    We'll continue to monitor.

## 2023-01-02 NOTE — PROGRESS NOTES
Writer met with patient regarding after care plans and discharging plans. Patient stated he would like to gain a sponsor and try to be in sobriety with a sponsor. Patient reported having a sponsor in mind. Patient reported he had this plan in place if he were to relapse and he wants to try and follow this plan before admitting into residential or IOP treatment. Patient stated he would like to get back to work because he has missed an important time at his job and he may lost his job. Patient spoke with writer regarding his son, patient states he has a son and wants to be apart of his son's life however, that has not been able to happen due to patient's use. Patient reported wanting to try the sponsor route and gain a relationship with his son.

## 2023-01-02 NOTE — PLAN OF CARE
"  Problem: Alcohol Withdrawal  Goal: Alcohol Withdrawal Symptom Control  Outcome: Progressing   Goal Outcome Evaluation:    Plan of Care Reviewed With: patient        Patient did not receive valium for more than 24 hours, thus patient is out of Detox . Total valium since arrival to the hospital = 70mg (all on 3A), pt also received 5mg ativan in the ER prior to transfer.    Patient alert and oriented with flat and blunted affect. Isolated to self in his room but was out for meals. Patient endorsed Anxiety of 4/10, Depression of 1/10 and Denies HI/SI/SIB and other psych symptoms. Contacted for safety. Patient reported having 80% of his dinner and adequate fluid intake. MSSA scores of 4 and 3. No  PRN valium given at this shift.  Patient c/o N/V. /85   Pulse 92   Temp 97.5  F (36.4  C) (Temporal)   Resp 16   Ht 1.89 m (6' 2.4\")   Wt 72.6 kg (160 lb)   SpO2 97%   BMI 20.32 kg/m    Staff will continue to monitor.          "

## 2023-01-02 NOTE — PROGRESS NOTES
Writer spoke with patient regarding discharge planning, patient stated he spoke with his mother and his child's mother and they are both on board for patient to attend IOP. Writer gave patient resources in the [place] area such as [facility], [facility], and also [facility]. Patient stated he will look over the brochures and call places his self.

## 2023-01-02 NOTE — PROGRESS NOTES
Pt given copy of their discharge instructions and medication administration instructions. All discharge plans and labs were discussed with patient. Pt reports no questions at this time regarding discharge plans, labs or medications. Pt denies any suicidal ideation, plans or intent at this time. Patient discharge assisted via P[facility] Francesco directly to the lobby. Patient plan is to return home and pursue IOP at [facility] and attend AA meetings. Patient is discharged at this time.

## 2023-01-02 NOTE — PROGRESS NOTES
Writer spoke with patient, patient is set to complete an intake at [facility] on 1/4/22 at 1pm with staff Hiram. Patient is ready to be discharged. Spoke with patients nurse and he will make an medical appointment with the patient.

## 2023-01-02 NOTE — DISCHARGE SUMMARY
Jonh Ritter MRN# [id number]   Age: 42 year old YOB: 1980     Date of Admission:  12/30/2022  Date of Discharge:  1/2/2023  Admitting Physician:  Doe Higuera MD  Discharge Physician:  Moises Link MD      DISCHARGE  DX    ALCOHOL USE DX SEVERE         Event Leading to Hospitalization:     See Admission note by admitting provider for patient encounter. for additional details.          Hospital Course:   PATIENT was admitted to Station [facility]with attending  under DR Perez, MD sallie Bauer, please review the detailed admit note on 12/31/22   The patient was placed under status 15 (15 minute checks) to ensure patient safety.   MSSA protocol was initiated due to the patient's history of alcohol abuse and concern for withdrawal symptoms.  CBC, BMP and utox obtained.    All outpatient medications were continued    PATIENTdid participate in groups and was visible in the milieu.     The patient's symptoms of ALCOHOL WITHDRAWAL  improved.     Patients energy motivation , sleep appetite improved.  Pt completed detox . It was un eventful.    PT has elevated lft so antabuse was not ordered  Discussed with patient medications for craving.  Spoke with patient about triggers coping skills relapse prevention.    CONSULTS DONE DURING PATIENTS HOSPITALIZATION.  Patient was seen by medicine on date12/31/22    This as per their medical consult    LABORATORY DATA:  Repeat comprehensive medical panel revealed a mildly elevated total bilirubin of 1.9 and AST of 87.  Otherwise, second set of CBC and comprehensive metabolic panel unremarkable.     IMPRESSION:    1.  Alcohol abuse and withdrawal per Dr. Link.  2.  Mild nausea, most likely secondary to alcohol withdrawal, stable.  3.  Psoriasis, mainly on elbows, normally controlled on prior to admission topical triamcinolone.  4.  Mild hyperbilirubinemia and abnormal AST on this morning's repeat labs, most likely secondary to his heavy alcohol  abuse prior to admission and expect to resolve as patient continues to detoxify and abstain from future alcohol use.     PLAN:  Continue with prior to admission scheduled topical triamcinolone b.i.d. for his psoriasis as well as already ordered as-needed Zofran for his nausea.  Monitor for improvement.  He should be instructed to follow up with his family physician after discharge within 1-2 weeks for hospital followup and repeat labs including liver function testing.  The patient is medically stable and medicine signing off.  Please feel free to call if questions.     Thank you for this consultation.            Pt was seen by cm  As per recommendations from cm  Writer spoke with patient regarding discharge planning, patient stated he spoke with his mother and his child's mother and they are both on board for patient to attend IOP. Writer gave patient resources in the [place] area such as [facility], [facility], and also [facility]. Patient stated he will look over the brochures and call places his self.            Labs:reviewed with patient     No results found for this or any previous visit (from the past 48 hour(s)).      Recent Results (from the past 240 hour(s))   Comprehensive metabolic panel    Collection Time: 12/29/22  3:45 AM   Result Value Ref Range    Sodium 137 133 - 144 mmol/L    Potassium 3.6 3.4 - 5.3 mmol/L    Chloride 101 94 - 109 mmol/L    Carbon Dioxide (CO2) 28 20 - 32 mmol/L    Anion Gap 8 3 - 14 mmol/L    Urea Nitrogen 6 (L) 7 - 30 mg/dL    Creatinine 0.86 0.66 - 1.25 mg/dL    Calcium 9.0 8.5 - 10.1 mg/dL    Glucose 134 (H) 70 - 99 mg/dL    Alkaline Phosphatase 91 40 - 150 U/L    AST 66 (H) 0 - 45 U/L    ALT 48 0 - 70 U/L    Protein Total 8.6 6.8 - 8.8 g/dL    Albumin 4.5 3.4 - 5.0 g/dL    Bilirubin Total 1.0 0.2 - 1.3 mg/dL    GFR Estimate >90 >60 mL/min/1.73m2   Lipase    Collection Time: 12/29/22  3:45 AM   Result Value Ref Range    Lipase 113 73 - 393 U/L    Alcohol level blood    Collection Time: 12/29/22  3:45 AM   Result Value Ref Range    Alcohol ethyl 0.33 (HH) <=0.01 g/dL   CBC with platelets and differential    Collection Time: 12/29/22  3:45 AM   Result Value Ref Range    WBC Count 9.2 4.0 - 11.0 10e3/uL    RBC Count 4.92 4.40 - 5.90 10e6/uL    Hemoglobin 17.6 13.3 - 17.7 g/dL    Hematocrit 48.9 40.0 - 53.0 %    MCV 99 78 - 100 fL    MCH 35.8 (H) 26.5 - 33.0 pg    MCHC 36.0 31.5 - 36.5 g/dL    RDW 12.2 10.0 - 15.0 %    Platelet Count 316 150 - 450 10e3/uL    % Neutrophils 67 %    % Lymphocytes 21 %    % Monocytes 10 %    % Eosinophils 1 %    % Basophils 1 %    % Immature Granulocytes 0 %    NRBCs per 100 WBC 0 <1 /100    Absolute Neutrophils 6.1 1.6 - 8.3 10e3/uL    Absolute Lymphocytes 2.0 0.8 - 5.3 10e3/uL    Absolute Monocytes 0.9 0.0 - 1.3 10e3/uL    Absolute Eosinophils 0.1 0.0 - 0.7 10e3/uL    Absolute Basophils 0.1 0.0 - 0.2 10e3/uL    Absolute Immature Granulocytes 0.0 <=0.4 10e3/uL    Absolute NRBCs 0.0 10e3/uL   Extra Red Top Tube    Collection Time: 12/29/22  3:46 AM   Result Value Ref Range    Hold Specimen [facility]    Asymptomatic COVID-19 Virus (Coronavirus) by PCR Nasopharyngeal    Collection Time: 12/29/22  5:11 AM    Specimen: Nasopharyngeal; Swab   Result Value Ref Range    SARS CoV2 PCR Negative Negative   Alcohol breath test POCT    Collection Time: 12/30/22  1:13 PM   Result Value Ref Range    Alcohol Breath Test 0.251 (A) 0.00 - 0.01   Comprehensive metabolic panel    Collection Time: 12/30/22  1:43 PM   Result Value Ref Range    Sodium 141 133 - 144 mmol/L    Potassium 4.1 3.4 - 5.3 mmol/L    Chloride 103 94 - 109 mmol/L    Carbon Dioxide (CO2) 31 20 - 32 mmol/L    Anion Gap 7 3 - 14 mmol/L    Urea Nitrogen 4 (L) 7 - 30 mg/dL    Creatinine 0.86 0.66 - 1.25 mg/dL    Calcium 9.7 8.5 - 10.1 mg/dL    Glucose 106 (H) 70 - 99 mg/dL    Alkaline Phosphatase 89 40 - 150 U/L    AST 87 (H) 0 - 45 U/L    ALT 60 0 - 70 U/L    Protein Total 8.1 6.8 - 8.8  g/dL    Albumin 4.3 3.4 - 5.0 g/dL    Bilirubin Total 1.9 (H) 0.2 - 1.3 mg/dL    GFR Estimate >90 >60 mL/min/1.73m2   Lipase    Collection Time: 12/30/22  1:43 PM   Result Value Ref Range    Lipase 120 73 - 393 U/L   Ethyl Alcohol Level    Collection Time: 12/30/22  1:43 PM   Result Value Ref Range    Alcohol ethyl 0.29 (H) <=0.01 g/dL   CBC with platelets and differential    Collection Time: 12/30/22  1:43 PM   Result Value Ref Range    WBC Count 5.6 4.0 - 11.0 10e3/uL    RBC Count 4.62 4.40 - 5.90 10e6/uL    Hemoglobin 16.6 13.3 - 17.7 g/dL    Hematocrit 46.5 40.0 - 53.0 %     (H) 78 - 100 fL    MCH 35.9 (H) 26.5 - 33.0 pg    MCHC 35.7 31.5 - 36.5 g/dL    RDW 12.2 10.0 - 15.0 %    Platelet Count 242 150 - 450 10e3/uL    % Neutrophils 67 %    % Lymphocytes 22 %    % Monocytes 9 %    % Eosinophils 1 %    % Basophils 1 %    % Immature Granulocytes 0 %    NRBCs per 100 WBC 0 <1 /100    Absolute Neutrophils 3.7 1.6 - 8.3 10e3/uL    Absolute Lymphocytes 1.2 0.8 - 5.3 10e3/uL    Absolute Monocytes 0.5 0.0 - 1.3 10e3/uL    Absolute Eosinophils 0.1 0.0 - 0.7 10e3/uL    Absolute Basophils 0.1 0.0 - 0.2 10e3/uL    Absolute Immature Granulocytes 0.0 <=0.4 10e3/uL    Absolute NRBCs 0.0 10e3/uL   Asymptomatic Influenza A/B & SARS-CoV2 (COVID-19) Virus PCR Multiplex Nasopharyngeal    Collection Time: 12/30/22  3:22 PM    Specimen: Nasopharyngeal; Swab   Result Value Ref Range    Influenza A PCR Negative Negative    Influenza B PCR Negative Negative    RSV PCR Negative Negative    SARS CoV2 PCR Negative Negative   Drug abuse screen 1 urine (ED)    Collection Time: 12/30/22  7:46 PM   Result Value Ref Range    Amphetamines Urine Screen Negative Screen Negative    Barbiturates Urine Screen Negative Screen Negative    Benzodiazepines Urine Screen Negative Screen Negative    Cannabinoids Urine Screen Negative Screen Negative    Cocaine Urine Screen Negative Screen Negative    Opiates Urine Screen Negative Screen Negative             Because this patient meets criteria for an Alcohol Use Disorder, I performed the following brief intervention on the date of this note:              1) Expressed concern that the patient is drinking at unhealthy levels known to increase their risk of alcohol related problems              2) Gave feedback linking alcohol use and health, including personalized feedback explaining how alcohol use can interact with their medical and/or psychiatric problems, and with prescribed medications.              3) Advised patient to abstain.    PT counseled on nicotine cessation and nicotine replacement provided    Discussed with patient many issues of addiction,triggers, relapse, and establishing a solid recovery program.    DISCHARGE MENTAL STATUS EXAMINATION:  The patient is alert, oriented x3.  Good fund of knowledge.  Good use of language.  Recent and remote memory, language, fund of knowledge are all adequate.  Euthymic mood congruent affect  Speech normal rate/rhythm linear tp no loose asso,The patient does not have any active suicidal or homicidal ideation.  Does not have any auditory or visual hallucination.  Fair insight/judgment At this time, the patient was stable to be discharged.        Pt was not determined to not be a danger to himself or others. At the current time of discharge, the patient does not meet criteria for involuntary hospitalization. On the day of discharge, the patient reports that they do not have suicidal or homicidal ideation and would never hurt themselves or others. Steps taken to minimize risk include: assessing patient s behavior and thought process daily during hospital stay, discharging patient with adequate plan for follow up for mental and physical health and discussing safety plan of returning to the hospital should the patient ever have thoughts of harming themselves or others. Therefore, based on all available evidence including the factors cited above, the patient does not  "appear to be at imminent risk for self-harm, and is appropriate for outpatient level of care.     Educated about side effects/risk vs benefits /alternative including non treatment.Pt consented to be on medication.     .Total time spent on discharge summary more than 35 min  More than  20 min  planning, coordination of care, medication reconciliation and performance of physical exam on day of discharge.Care was coordinated with unit RN and unit therapist         Review of your medicines      UNREVIEWED medicines. Ask your doctor about these medicines      Dose / Directions   ibuprofen 200 MG tablet  Commonly known as: ADVIL/MOTRIN      Dose: 200 mg  Take 200 mg by mouth every 4 hours as needed for pain  Refills: 0     triamcinolone 0.1 % external cream  Commonly known as: KENALOG  Used for: Psoriasis      Apply sparingly to affected area three times daily as needed  Quantity: 240 g  Refills: 3             Disposition: Patient going home     Facts about COVID19 at www.cdc.gov/COVID19 and www.[place].gov/covid19     Keeping hands clean is one of the most important steps we can take to avoid getting sick and spreading germs to others.  Please wash your hands frequently and lather with soap for at least 20 seconds!     Medical Follow-Up:Disposition: Return home and complete admissions for IOP at [facility] on 1/04/22 at 1pm.      Follow-up Appointment:   You declined to set up a follow up appointment prior to discharge stating you have not established care after your insurance changed to [employer]. Please be seen and establish care within 3 weeks of discharge.  [facility] [place]  [address], [place] [address] [phone]            Treatment Follow-Up:Disposition: Return home and complete admissions for IOP at [facility] on 1/04/22 at 1pm.        .        \"Much or all of the text in this note was generated through the use of Dragon Dictate voice to text software. " "Errors in spelling or words which appear to be out of contact are unintentional, may be present due having escaped editing\"     "

## 2023-01-04 ENCOUNTER — PATIENT OUTREACH (OUTPATIENT)
Dept: CARE COORDINATION | Facility: CLINIC | Age: 43
End: 2023-01-04

## 2023-01-04 NOTE — PROGRESS NOTES
Clinic Care Coordination Contact  [facility]: Post-Discharge Note  SITUATION                                                      Admission:    Admission Date: 11/30/22   Reason for Admission: ALCOHOL USE DX SEVERE  Discharge:   Discharge Date: 01/03/23  Discharge Diagnosis: ALCOHOL USE DX SEVERE    BACKGROUND                                                      PATIENT was admitted to [facility]with attending  under DR Perez, MD sallie Bauer, please review the detailed admit note on 12/31/22   The patient was placed under status 15 (15 minute checks) to ensure patient safety.   MSSA protocol was initiated due to the patient's history of alcohol abuse and concern for withdrawal symptoms.  CBC, BMP and utox obtained.     All outpatient medications were continued     PATIENTdid participate in groups and was visible in the milieu.      The patient's symptoms of ALCOHOL WITHDRAWAL  improved.     Patients energy motivation , sleep appetite improved.  Pt completed detox . It was un eventful.     PT has elevated lft so antabuse was not ordered  Discussed with patient medications for craving.  Spoke with patient about triggers coping skills relapse prevention.     CONSULTS DONE DURING PATIENTS HOSPITALIZATION.  Patient was seen by medicine on date12/31/22     This as per their medical consult     LABORATORY DATA:  Repeat comprehensive medical panel revealed a mildly elevated total bilirubin of 1.9 and AST of 87.  Otherwise, second set of CBC and comprehensive metabolic panel unremarkable.     IMPRESSION:    1.  Alcohol abuse and withdrawal per Dr. Link.  2.  Mild nausea, most likely secondary to alcohol withdrawal, stable.  3.  Psoriasis, mainly on elbows, normally controlled on prior to admission topical triamcinolone.  4.  Mild hyperbilirubinemia and abnormal AST on this morning's repeat labs, most likely secondary to his heavy alcohol abuse prior to admission and expect to resolve as patient  continues to detoxify and abstain from future alcohol use.     PLAN:  Continue with prior to admission scheduled topical triamcinolone b.i.d. for his psoriasis as well as already ordered as-needed Zofran for his nausea.  Monitor for improvement.  He should be instructed to follow up with his family physician after discharge within 1-2 weeks for hospital followup and repeat labs including liver function testing.  The patient is medically stable and medicine signing off.  Please feel free to call if questions.     Thank you for this consultation.    ASSESSMENT           Discharge Assessment  How are you doing now that you are home?: Patient shares that he is doing well. Has CD assessment and will be pursuing treatment. Has all resources needed right now. Declines need for CCC. Thanks writer for the call  How are your symptoms? (Red Flag symptoms escalate to triage hotline per guidelines): Improved  Do you feel your condition is stable enough to be safe at home until your provider visit?: Yes  Does the patient have their discharge instructions? : Yes  Does the patient have questions regarding their discharge instructions? : No  Were you started on any new medications or were there changes to any of your previous medications? : Yes  Does the patient have all of their medications?: Yes  Do you have questions regarding any of your medications? : No  Do you have all of your needed medical supplies or equipment (DME)?  (i.e. oxygen tank, CPAP, cane, etc.): Yes  Discharge follow-up appointment scheduled within 14 calendar days? : Yes  Discharge Follow Up Appointment Date: 01/04/23  Discharge Follow Up Appointment Scheduled with?: Specialty Care Provider (CD assessment)    Post-op ([facility] Only)  If the patient had a surgery or procedure, do they have any questions for a nurse?: No    Post-op (Clinicians Only)  Did the patient have surgery or a procedure: No  Fever: No  Chills: No      PLAN                                                       Outpatient Plan:  Medical Follow-Up:Disposition: Return home and complete admissions for IOP at [facility] on 1/04/22 at 1pm.        No future appointments.      For any urgent concerns, please contact our 24 hour nurse triage line: [phone] ([phone])         SUHAS Woodruff

## 2023-02-08 ENCOUNTER — NURSE TRIAGE (OUTPATIENT)
Dept: NURSING | Facility: CLINIC | Age: 43
End: 2023-02-08
Payer: COMMERCIAL

## 2023-02-08 NOTE — TELEPHONE ENCOUNTER
"Nurse Triage SBAR    Is this a 2nd Level Triage? NO    Situation: Mom calling to ask if son can get a prescription for Paxlovid.  Consent: Not obtained - advised having patient call back    Background: Mother states patient was diagnosed with Covid on Sun    Assessment: Unable to assess  According to mother:  Poor appetite  \"Freaks out when he gets sick\"  Coughing  Breathing ok    Protocol Recommended Disposition:   Home care    Recommendation: Advised having patient call nurse line for triage and advice. Can also try using MyChart to see treatment options. Parent verbalized understanding and agreed with plan.       Lorri Seaman RN [facility] 2/8/2023 2:46 PM    Reason for Disposition    Information only question and nurse able to answer    Protocols used: INFORMATION ONLY CALL - NO TRIAGE-A-OH      "

## 2023-02-08 NOTE — TELEPHONE ENCOUNTER
Pt is wanting a prescription of Paxlovid quickly. He is on day four of his symptoms and would like to receive as quickly as possible. Pt reports currently his symptoms are headache, bodyaches, runny nose, mild nausea and diarrhea. TA temperature 99.7 at time of phone call. Has had several doses of ibuprofen over past 24 hours. Pt reports his symptoms started on 2/5/23 and he has had a fever for over three days. Pt does not monitor his oxygen levels. Pt reports he has a severe headache at time of call, worst headache he has ever had. He declines to be seen in the ER. Pt is able to speak in full sentences and sounds calm, mildly nasal.       COVID Positive/Requesting COVID treatment    Patient is positive for COVID and requesting treatment options.    Date of positive COVID test (PCR or at home)? 2/8/23  Current COVID symptoms: fever or chills, muscle or body aches, headache, congestion or runny nose and nausea or vomiting  Date COVID symptoms began: 2/5/23    Message should be routed to clinic RN pool. Best phone number to use for call back: [phone].      Reason for Disposition    Fever present > 3 days (72 hours)    Additional Information    Negative: SEVERE difficulty breathing (e.g., struggling for each breath, speaks in single words)    Negative: Difficult to awaken or acting confused (e.g., disoriented, slurred speech)    Negative: Bluish (or gray) lips or face now    Negative: Shock suspected (e.g., cold/pale/clammy skin, too weak to stand, low BP, rapid pulse)    Negative: Sounds like a life-threatening emergency to the triager    Negative: [1] Diagnosed or suspected COVID-19 AND [2] symptoms lasting 3 or more weeks    Negative: [1] COVID-19 exposure AND [2] no symptoms    Negative: COVID-19 vaccine reaction suspected (e.g., fever, headache, muscle aches) occurring 1 to 3 days after getting vaccine    Negative: COVID-19 vaccine, questions about    Negative: [1] Lives with someone known to have influenza  (flu test positive) AND [2] flu-like symptoms (e.g., cough, runny nose, sore throat, SOB; with or without fever)    Negative: [1] Adult with possible COVID-19 symptoms AND [2] triager concerned about severity of symptoms or other causes    Negative: COVID-19 and breastfeeding, questions about    Negative: SEVERE or constant chest pain or pressure  (Exception: Mild central chest pain, present only when coughing.)    Negative: MODERATE difficulty breathing (e.g., speaks in phrases, SOB even at rest, pulse 100-120)    Negative: Headache and stiff neck (can't touch chin to chest)    Negative: Oxygen level (e.g., pulse oximetry) 90 percent or lower    Negative: Chest pain or pressure  (Exception: MILD central chest pain, present only when coughing)    Negative: Patient sounds very sick or weak to the triager    Protocols used: CORONAVIRUS (COVID-19) DIAGNOSED OR [id number]

## 2023-02-10 ENCOUNTER — APPOINTMENT (OUTPATIENT)
Dept: GENERAL RADIOLOGY | Facility: CLINIC | Age: 43
End: 2023-02-10
Attending: INTERNAL MEDICINE
Payer: COMMERCIAL

## 2023-02-10 ENCOUNTER — HOSPITAL ENCOUNTER (OUTPATIENT)
Facility: CLINIC | Age: 43
Setting detail: OBSERVATION
LOS: 1 days | Discharge: HOME | End: 2023-02-12
Attending: EMERGENCY MEDICINE | Admitting: INTERNAL MEDICINE
Payer: COMMERCIAL

## 2023-02-10 DIAGNOSIS — F10.930 ALCOHOL WITHDRAWAL SYNDROME WITHOUT COMPLICATION (H): ICD-10-CM

## 2023-02-10 DIAGNOSIS — F10.10 ALCOHOL ABUSE: Primary | ICD-10-CM

## 2023-02-10 DIAGNOSIS — F10.29 ALCOHOL DEPENDENCE WITH UNSPECIFIED ALCOHOL-INDUCED DISORDER (H): ICD-10-CM

## 2023-02-10 DIAGNOSIS — U07.1 INFECTION DUE TO 2019 NOVEL CORONAVIRUS: ICD-10-CM

## 2023-02-10 LAB
ALBUMIN SERPL BCG-MCNC: 4.7 G/DL (ref 3.5–5.2)
ALP SERPL-CCNC: 108 U/L (ref 40–129)
ALT SERPL W P-5'-P-CCNC: 31 U/L (ref 10–50)
ANION GAP SERPL CALCULATED.3IONS-SCNC: 14 MMOL/L (ref 7–15)
AST SERPL W P-5'-P-CCNC: 67 U/L (ref 10–50)
BASOPHILS # BLD AUTO: 0 10E3/UL (ref 0–0.2)
BASOPHILS NFR BLD AUTO: 1 %
BILIRUB SERPL-MCNC: 1.3 MG/DL
BUN SERPL-MCNC: 7.9 MG/DL (ref 6–20)
CALCIUM SERPL-MCNC: 9.4 MG/DL (ref 8.6–10)
CHLORIDE SERPL-SCNC: 96 MMOL/L (ref 98–107)
CREAT SERPL-MCNC: 0.88 MG/DL (ref 0.67–1.17)
DEPRECATED HCO3 PLAS-SCNC: 31 MMOL/L (ref 22–29)
EOSINOPHIL # BLD AUTO: 0 10E3/UL (ref 0–0.7)
EOSINOPHIL NFR BLD AUTO: 0 %
ERYTHROCYTE [DISTWIDTH] IN BLOOD BY AUTOMATED COUNT: 12.1 % (ref 10–15)
ETHANOL SERPL-MCNC: 0.29 G/DL
FLUAV RNA SPEC QL NAA+PROBE: NEGATIVE
FLUBV RNA RESP QL NAA+PROBE: NEGATIVE
GFR SERPL CREATININE-BSD FRML MDRD: >90 ML/MIN/1.73M2
GLUCOSE SERPL-MCNC: 101 MG/DL (ref 70–99)
HCT VFR BLD AUTO: 51.1 % (ref 40–53)
HGB BLD-MCNC: 18.4 G/DL (ref 13.3–17.7)
IMM GRANULOCYTES # BLD: 0 10E3/UL
IMM GRANULOCYTES NFR BLD: 0 %
LIPASE SERPL-CCNC: 20 U/L (ref 13–60)
LYMPHOCYTES # BLD AUTO: 0.9 10E3/UL (ref 0.8–5.3)
LYMPHOCYTES NFR BLD AUTO: 15 %
MAGNESIUM SERPL-MCNC: 2.2 MG/DL (ref 1.7–2.3)
MCH RBC QN AUTO: 35.5 PG (ref 26.5–33)
MCHC RBC AUTO-ENTMCNC: 36 G/DL (ref 31.5–36.5)
MCV RBC AUTO: 99 FL (ref 78–100)
MONOCYTES # BLD AUTO: 0.8 10E3/UL (ref 0–1.3)
MONOCYTES NFR BLD AUTO: 14 %
NEUTROPHILS # BLD AUTO: 4.3 10E3/UL (ref 1.6–8.3)
NEUTROPHILS NFR BLD AUTO: 70 %
NRBC # BLD AUTO: 0 10E3/UL
NRBC BLD AUTO-RTO: 0 /100
PHOSPHATE SERPL-MCNC: 3 MG/DL (ref 2.5–4.5)
PHOSPHATE SERPL-MCNC: 3.2 MG/DL (ref 2.5–4.5)
PLATELET # BLD AUTO: 236 10E3/UL (ref 150–450)
POTASSIUM SERPL-SCNC: 4.2 MMOL/L (ref 3.4–5.3)
PROT SERPL-MCNC: 7.9 G/DL (ref 6.4–8.3)
RBC # BLD AUTO: 5.18 10E6/UL (ref 4.4–5.9)
RSV RNA SPEC NAA+PROBE: NEGATIVE
SARS-COV-2 RNA RESP QL NAA+PROBE: POSITIVE
SODIUM SERPL-SCNC: 141 MMOL/L (ref 136–145)
WBC # BLD AUTO: 6.1 10E3/UL (ref 4–11)

## 2023-02-10 PROCEDURE — 99222 1ST HOSP IP/OBS MODERATE 55: CPT | Mod: AI | Performed by: INTERNAL MEDICINE

## 2023-02-10 PROCEDURE — 250N000009 HC RX 250: Performed by: EMERGENCY MEDICINE

## 2023-02-10 PROCEDURE — 96372 THER/PROPH/DIAG INJ SC/IM: CPT | Mod: XS | Performed by: INTERNAL MEDICINE

## 2023-02-10 PROCEDURE — 87637 SARSCOV2&INF A&B&RSV AMP PRB: CPT | Performed by: EMERGENCY MEDICINE

## 2023-02-10 PROCEDURE — 85025 COMPLETE CBC W/AUTO DIFF WBC: CPT | Performed by: EMERGENCY MEDICINE

## 2023-02-10 PROCEDURE — 96366 THER/PROPH/DIAG IV INF ADDON: CPT

## 2023-02-10 PROCEDURE — 71045 X-RAY EXAM CHEST 1 VIEW: CPT

## 2023-02-10 PROCEDURE — 84100 ASSAY OF PHOSPHORUS: CPT | Performed by: INTERNAL MEDICINE

## 2023-02-10 PROCEDURE — 36415 COLL VENOUS BLD VENIPUNCTURE: CPT | Performed by: EMERGENCY MEDICINE

## 2023-02-10 PROCEDURE — 96375 TX/PRO/DX INJ NEW DRUG ADDON: CPT

## 2023-02-10 PROCEDURE — 258N000003 HC RX IP 258 OP 636: Performed by: EMERGENCY MEDICINE

## 2023-02-10 PROCEDURE — G0378 HOSPITAL OBSERVATION PER HR: HCPCS

## 2023-02-10 PROCEDURE — 96376 TX/PRO/DX INJ SAME DRUG ADON: CPT

## 2023-02-10 PROCEDURE — 80053 COMPREHEN METABOLIC PANEL: CPT | Performed by: EMERGENCY MEDICINE

## 2023-02-10 PROCEDURE — 36415 COLL VENOUS BLD VENIPUNCTURE: CPT | Performed by: INTERNAL MEDICINE

## 2023-02-10 PROCEDURE — 250N000011 HC RX IP 250 OP 636: Performed by: EMERGENCY MEDICINE

## 2023-02-10 PROCEDURE — 250N000011 HC RX IP 250 OP 636: Performed by: INTERNAL MEDICINE

## 2023-02-10 PROCEDURE — 84100 ASSAY OF PHOSPHORUS: CPT | Performed by: EMERGENCY MEDICINE

## 2023-02-10 PROCEDURE — 96365 THER/PROPH/DIAG IV INF INIT: CPT

## 2023-02-10 PROCEDURE — 82077 ASSAY SPEC XCP UR&BREATH IA: CPT | Performed by: EMERGENCY MEDICINE

## 2023-02-10 PROCEDURE — 83690 ASSAY OF LIPASE: CPT | Performed by: EMERGENCY MEDICINE

## 2023-02-10 PROCEDURE — 250N000013 HC RX MED GY IP 250 OP 250 PS 637: Performed by: INTERNAL MEDICINE

## 2023-02-10 PROCEDURE — C9803 HOPD COVID-19 SPEC COLLECT: HCPCS

## 2023-02-10 PROCEDURE — 83735 ASSAY OF MAGNESIUM: CPT | Performed by: INTERNAL MEDICINE

## 2023-02-10 PROCEDURE — 99285 EMERGENCY DEPT VISIT HI MDM: CPT | Mod: CS,25

## 2023-02-10 RX ORDER — ENOXAPARIN SODIUM 100 MG/ML
40 INJECTION SUBCUTANEOUS EVERY 24 HOURS
Status: DISCONTINUED | OUTPATIENT
Start: 2023-02-10 | End: 2023-02-12 | Stop reason: HOSPADM

## 2023-02-10 RX ORDER — DIAZEPAM 5 MG
10 TABLET ORAL EVERY 30 MIN PRN
Status: DISCONTINUED | OUTPATIENT
Start: 2023-02-10 | End: 2023-02-10

## 2023-02-10 RX ORDER — GABAPENTIN 600 MG/1
1200 TABLET ORAL ONCE
Status: COMPLETED | OUTPATIENT
Start: 2023-02-10 | End: 2023-02-10

## 2023-02-10 RX ORDER — HALOPERIDOL 5 MG/ML
2.5-5 INJECTION INTRAMUSCULAR EVERY 6 HOURS PRN
Status: DISCONTINUED | OUTPATIENT
Start: 2023-02-10 | End: 2023-02-12 | Stop reason: HOSPADM

## 2023-02-10 RX ORDER — DIAZEPAM 5 MG
10 TABLET ORAL EVERY 30 MIN PRN
Status: DISCONTINUED | OUTPATIENT
Start: 2023-02-10 | End: 2023-02-12 | Stop reason: HOSPADM

## 2023-02-10 RX ORDER — OLANZAPINE 5 MG/1
5-10 TABLET, ORALLY DISINTEGRATING ORAL EVERY 6 HOURS PRN
Status: DISCONTINUED | OUTPATIENT
Start: 2023-02-10 | End: 2023-02-12 | Stop reason: HOSPADM

## 2023-02-10 RX ORDER — DIAZEPAM 10 MG/2ML
10 INJECTION, SOLUTION INTRAMUSCULAR; INTRAVENOUS ONCE
Status: COMPLETED | OUTPATIENT
Start: 2023-02-10 | End: 2023-02-10

## 2023-02-10 RX ORDER — ONDANSETRON 4 MG/1
4 TABLET, ORALLY DISINTEGRATING ORAL EVERY 6 HOURS PRN
Status: DISCONTINUED | OUTPATIENT
Start: 2023-02-10 | End: 2023-02-12 | Stop reason: HOSPADM

## 2023-02-10 RX ORDER — DIAZEPAM 10 MG/2ML
5-10 INJECTION, SOLUTION INTRAMUSCULAR; INTRAVENOUS EVERY 30 MIN PRN
Status: DISCONTINUED | OUTPATIENT
Start: 2023-02-10 | End: 2023-02-12 | Stop reason: HOSPADM

## 2023-02-10 RX ORDER — GABAPENTIN 300 MG/1
300 CAPSULE ORAL EVERY 8 HOURS
Status: DISCONTINUED | OUTPATIENT
Start: 2023-02-16 | End: 2023-02-12 | Stop reason: HOSPADM

## 2023-02-10 RX ORDER — ACETAMINOPHEN 650 MG/1
650 SUPPOSITORY RECTAL EVERY 6 HOURS PRN
Status: DISCONTINUED | OUTPATIENT
Start: 2023-02-10 | End: 2023-02-12 | Stop reason: HOSPADM

## 2023-02-10 RX ORDER — PROCHLORPERAZINE 25 MG
25 SUPPOSITORY, RECTAL RECTAL EVERY 12 HOURS PRN
Status: DISCONTINUED | OUTPATIENT
Start: 2023-02-10 | End: 2023-02-12 | Stop reason: HOSPADM

## 2023-02-10 RX ORDER — CLONIDINE HYDROCHLORIDE 0.1 MG/1
0.1 TABLET ORAL EVERY 8 HOURS
Status: DISCONTINUED | OUTPATIENT
Start: 2023-02-10 | End: 2023-02-12 | Stop reason: HOSPADM

## 2023-02-10 RX ORDER — FOLIC ACID 1 MG/1
1 TABLET ORAL DAILY
Status: DISCONTINUED | OUTPATIENT
Start: 2023-02-11 | End: 2023-02-12 | Stop reason: HOSPADM

## 2023-02-10 RX ORDER — ACETAMINOPHEN 325 MG/1
650 TABLET ORAL EVERY 6 HOURS PRN
Status: DISCONTINUED | OUTPATIENT
Start: 2023-02-10 | End: 2023-02-12 | Stop reason: HOSPADM

## 2023-02-10 RX ORDER — ONDANSETRON 2 MG/ML
4 INJECTION INTRAMUSCULAR; INTRAVENOUS EVERY 6 HOURS PRN
Status: DISCONTINUED | OUTPATIENT
Start: 2023-02-10 | End: 2023-02-12 | Stop reason: HOSPADM

## 2023-02-10 RX ORDER — ONDANSETRON 2 MG/ML
4 INJECTION INTRAMUSCULAR; INTRAVENOUS EVERY 30 MIN PRN
Status: DISCONTINUED | OUTPATIENT
Start: 2023-02-10 | End: 2023-02-10

## 2023-02-10 RX ORDER — PROCHLORPERAZINE MALEATE 5 MG
10 TABLET ORAL EVERY 6 HOURS PRN
Status: DISCONTINUED | OUTPATIENT
Start: 2023-02-10 | End: 2023-02-12 | Stop reason: HOSPADM

## 2023-02-10 RX ORDER — GABAPENTIN 100 MG/1
100 CAPSULE ORAL EVERY 8 HOURS
Status: DISCONTINUED | OUTPATIENT
Start: 2023-02-18 | End: 2023-02-12 | Stop reason: HOSPADM

## 2023-02-10 RX ORDER — FLUMAZENIL 0.1 MG/ML
0.2 INJECTION, SOLUTION INTRAVENOUS
Status: DISCONTINUED | OUTPATIENT
Start: 2023-02-10 | End: 2023-02-10

## 2023-02-10 RX ORDER — GABAPENTIN 300 MG/1
900 CAPSULE ORAL EVERY 8 HOURS
Status: DISCONTINUED | OUTPATIENT
Start: 2023-02-11 | End: 2023-02-12 | Stop reason: HOSPADM

## 2023-02-10 RX ORDER — DIAZEPAM 10 MG/2ML
5-10 INJECTION, SOLUTION INTRAMUSCULAR; INTRAVENOUS EVERY 30 MIN PRN
Status: DISCONTINUED | OUTPATIENT
Start: 2023-02-10 | End: 2023-02-10

## 2023-02-10 RX ORDER — GABAPENTIN 300 MG/1
600 CAPSULE ORAL EVERY 8 HOURS
Status: DISCONTINUED | OUTPATIENT
Start: 2023-02-14 | End: 2023-02-12 | Stop reason: HOSPADM

## 2023-02-10 RX ORDER — FLUMAZENIL 0.1 MG/ML
0.2 INJECTION, SOLUTION INTRAVENOUS
Status: DISCONTINUED | OUTPATIENT
Start: 2023-02-10 | End: 2023-02-12 | Stop reason: HOSPADM

## 2023-02-10 RX ORDER — MULTIPLE VITAMINS W/ MINERALS TAB 9MG-400MCG
1 TAB ORAL DAILY
Status: DISCONTINUED | OUTPATIENT
Start: 2023-02-11 | End: 2023-02-12 | Stop reason: HOSPADM

## 2023-02-10 RX ORDER — NICOTINE 21 MG/24HR
1 PATCH, TRANSDERMAL 24 HOURS TRANSDERMAL DAILY
Status: DISCONTINUED | OUTPATIENT
Start: 2023-02-10 | End: 2023-02-12 | Stop reason: HOSPADM

## 2023-02-10 RX ADMIN — DIAZEPAM 10 MG: 5 TABLET ORAL at 22:48

## 2023-02-10 RX ADMIN — FOLIC ACID: 5 INJECTION, SOLUTION INTRAMUSCULAR; INTRAVENOUS; SUBCUTANEOUS at 11:12

## 2023-02-10 RX ADMIN — GABAPENTIN 1200 MG: 600 TABLET, FILM COATED ORAL at 16:00

## 2023-02-10 RX ADMIN — ONDANSETRON 4 MG: 2 INJECTION INTRAMUSCULAR; INTRAVENOUS at 12:14

## 2023-02-10 RX ADMIN — ENOXAPARIN SODIUM 40 MG: 40 INJECTION SUBCUTANEOUS at 16:12

## 2023-02-10 RX ADMIN — DIAZEPAM 10 MG: 5 INJECTION INTRAMUSCULAR; INTRAVENOUS at 12:43

## 2023-02-10 RX ADMIN — ONDANSETRON 4 MG: 2 INJECTION INTRAMUSCULAR; INTRAVENOUS at 10:47

## 2023-02-10 RX ADMIN — ONDANSETRON 4 MG: 4 TABLET, ORALLY DISINTEGRATING ORAL at 22:53

## 2023-02-10 RX ADMIN — DIAZEPAM 10 MG: 5 TABLET ORAL at 16:53

## 2023-02-10 RX ADMIN — CLONIDINE HYDROCHLORIDE 0.1 MG: 0.1 TABLET ORAL at 16:01

## 2023-02-10 ASSESSMENT — ACTIVITIES OF DAILY LIVING (ADL)
ADLS_ACUITY_SCORE: 35
ADLS_ACUITY_SCORE: 31
ADLS_ACUITY_SCORE: 35
ADLS_ACUITY_SCORE: 31
ADLS_ACUITY_SCORE: 31

## 2023-02-10 ASSESSMENT — ENCOUNTER SYMPTOMS
FEVER: 0
VOMITING: 0
SEIZURES: 0
DIARRHEA: 0
HALLUCINATIONS: 0
CONFUSION: 0
TREMORS: 1
NAUSEA: 1

## 2023-02-10 NOTE — ED TRIAGE NOTES
EMS report: binge drinking for several days. C/O nausea. EMS gave zofran 4mg PO. . Tested positive for covid on Sunday with home test.    Patient states drank 1.5 L in past couple days     Triage Assessment     Row Name 02/10/23 1014       Triage Assessment (Adult)    Airway WDL WDL       Respiratory WDL    Respiratory WDL WDL       Skin Circulation/Temperature WDL    Skin Circulation/Temperature WDL WDL       Cardiac WDL    Cardiac WDL WDL       Peripheral/Neurovascular WDL    Peripheral Neurovascular WDL WDL       Cognitive/Neuro/Behavioral WDL    Cognitive/Neuro/Behavioral WDL WDL

## 2023-02-10 NOTE — ED NOTES
"[facility]  ED Nurse Handoff Report    ED Chief complaint: Alcohol Problem      ED Diagnosis:   Final diagnoses:   Alcohol dependence with unspecified alcohol-induced disorder (H)   Alcohol withdrawal syndrome without complication (H)       Code Status: Full Code    Allergies:   Allergies   Allergen Reactions     Penicillins      As infant       Patient Story: binge drinking for several days. Reports 1.5 L hard liquor over couple days. Last drink just prior to EMS arrival this morning. Reports has \"gone through this 4 times and only IV valium works for me. Detox just give you oral valium. I had a bad experience there\". Patient reports testing positive with home covid test on Sunday but denies covid symptoms here.  Focused Assessment:  Anxious, tremors, nausea (no vomiting).  Labs Ordered and Resulted from Time of ED Arrival to Time of ED Departure   COMPREHENSIVE METABOLIC PANEL - Abnormal       Result Value    Sodium 141      Potassium 4.2      Chloride 96 (*)     Carbon Dioxide (CO2) 31 (*)     Anion Gap 14      Urea Nitrogen 7.9      Creatinine 0.88      Calcium 9.4      Glucose 101 (*)     Alkaline Phosphatase 108      AST 67 (*)     ALT 31      Protein Total 7.9      Albumin 4.7      Bilirubin Total 1.3 (*)     GFR Estimate >90     INFLUENZA A/B & SARS-COV2 PCR MULTIPLEX - Abnormal    Influenza A PCR Negative      Influenza B PCR Negative      RSV PCR Negative      SARS CoV2 PCR Positive (*)    CBC WITH PLATELETS AND DIFFERENTIAL - Abnormal    WBC Count 6.1      RBC Count 5.18      Hemoglobin 18.4 (*)     Hematocrit 51.1      MCV 99      MCH 35.5 (*)     MCHC 36.0      RDW 12.1      Platelet Count 236      % Neutrophils 70      % Lymphocytes 15      % Monocytes 14      % Eosinophils 0      % Basophils 1      % Immature Granulocytes 0      NRBCs per 100 WBC 0      Absolute Neutrophils 4.3      Absolute Lymphocytes 0.9      Absolute Monocytes 0.8      Absolute Eosinophils 0.0      Absolute " "Basophils 0.0      Absolute Immature Granulocytes 0.0      Absolute NRBCs 0.0     ETHYL ALCOHOL LEVEL - Abnormal    Alcohol ethyl 0.29 (*)    LIPASE - Normal    Lipase 20     PHOSPHORUS       Treatments and/or interventions provided: banana bag, valium, supplemental o2 after valium (was NOT hypoxic prior to valium admin)  Patient's response to treatments and/or interventions: VSS, sleeping after valium    To be done/followed up on inpatient unit:  per admitting    Does this patient have any cognitive concerns?: n/a    Activity level - Baseline/Home:  Independent  Activity Level - Current:   Independent    Patient's Preferred language: English   Needed?: No    Isolation: COVID r/o and special precautions  Infection: COVID r/o and special precautions  Patient tested for COVID 19 prior to admission: YES  Bariatric?: No    Vital Signs:   Vitals:    02/10/23 1012 02/10/23 1157 02/10/23 1200   BP: 139/84  125/71   Pulse: 118     Resp: 18     Temp: 97.5  F (36.4  C)     TempSrc: Temporal     SpO2: 97% 98% 97%   Weight: 70.3 kg (155 lb)     Height: 1.88 m (6' 2\")         Cardiac Rhythm:     Was the PSS-3 completed:   Yes  What interventions are required if any?               Family Comments: patient has been making numerous personal calls throughout ED stay to family and friends  OBS brochure/video discussed/provided to patient/family: N/A    For the majority of the shift this patient's behavior was Green.   Behavioral interventions performed were care, explanations, rounding.    ED NURSE PHONE NUMBER: *[id number]         "

## 2023-02-10 NOTE — ED NOTES
Bed: ED19  Expected date:   Expected time:   Means of arrival:   Comments:  413 40m etoh, nausea, covid ETA 1002

## 2023-02-10 NOTE — ED PROVIDER NOTES
History     Chief Complaint:  Alcohol Problem       The history is provided by the patient.      Jonh Ritter is a 42 year old male with a history of alcohol abuse, depression and anxiety who presents with alcohol abuse. Patient reports he has been drinking around 1L a day since coming down with COVID in Sunday, noting that he is beginning to detox in the ED and is experiencing tremors. This morning, he called 9-1-1 on himself, remarking that his last drink was in front of the officers on scene. He has no prior episodes of withdrawal-related seizures, confusion or hallucinations. He has done detox in the past, noting he would like to try again and follow it up with a group. Endorses nausea. Denies fever, vomiting, diarrhea, rash, falls, homicidal/suicidal ideation, suicide attempts in the past, or hallucinations. Also denies any recent personal triggers resulting in alcohol consumption. He does not have a PCP. States his family is his primary support system.     Independent Historian:   None - Patient Only    Review of External Notes: See MDM     ROS:  Review of Systems   Constitutional: Negative for fever.   Gastrointestinal: Positive for nausea. Negative for diarrhea and vomiting.   Skin: Negative for rash.   Neurological: Positive for tremors. Negative for seizures.   Psychiatric/Behavioral: Negative for confusion, hallucinations, self-injury and suicidal ideas.   All other systems reviewed and are negative.    Allergies:  Penicillins     Medications:    The patient is not currently taking any prescribed medications.    Past Medical History:    Alcohol abuse   Psoriasis  Depression  Anxiety    Past Surgical History:    Inguinal hernia repair      Social History:  Presents alone   PCP: Enmanuel Sandoval     Physical Exam     Patient Vitals for the past 24 hrs:   BP Temp Temp src Pulse Resp SpO2 Height Weight   02/10/23 1200 125/71 -- -- -- -- 97 % -- --   02/10/23 1157 -- -- -- -- -- 98 % -- --   02/10/23 1012  "139/84 97.5  F (36.4  C) Temporal 118 18 97 % 1.88 m (6' 2\") 70.3 kg (155 lb)        Physical Exam  Constitutional: Well developed, nontox appearance, clinically intoxicated  Head: Atraumatic.   Mouth/Throat: Oropharynx is clear and moist.   Neck:  no stridor  Eyes: no scleral icterus  Cardiovascular: Regular tachycardia, 2+ bilat radial pulses  Pulmonary/Chest: nml resp effort, Clear BS bilat  Abdominal: ND, soft, NT, no rebound or guarding   : no CVA tenderness bilat  Ext: Warm, well perfused, no edema  Neurological: A&O, symmetric facies, moves ext x4  Skin: Skin is warm and dry.   Psychiatric: Behavior is normal. Thought content normal.   Nursing note and vitals reviewed.    Emergency Department Course     Laboratory:  Labs Ordered and Resulted from Time of ED Arrival to Time of ED Departure   COMPREHENSIVE METABOLIC PANEL - Abnormal       Result Value    Sodium 141      Potassium 4.2      Chloride 96 (*)     Carbon Dioxide (CO2) 31 (*)     Anion Gap 14      Urea Nitrogen 7.9      Creatinine 0.88      Calcium 9.4      Glucose 101 (*)     Alkaline Phosphatase 108      AST 67 (*)     ALT 31      Protein Total 7.9      Albumin 4.7      Bilirubin Total 1.3 (*)     GFR Estimate >90     INFLUENZA A/B & SARS-COV2 PCR MULTIPLEX - Abnormal    Influenza A PCR Negative      Influenza B PCR Negative      RSV PCR Negative      SARS CoV2 PCR Positive (*)    CBC WITH PLATELETS AND DIFFERENTIAL - Abnormal    WBC Count 6.1      RBC Count 5.18      Hemoglobin 18.4 (*)     Hematocrit 51.1      MCV 99      MCH 35.5 (*)     MCHC 36.0      RDW 12.1      Platelet Count 236      % Neutrophils 70      % Lymphocytes 15      % Monocytes 14      % Eosinophils 0      % Basophils 1      % Immature Granulocytes 0      NRBCs per 100 WBC 0      Absolute Neutrophils 4.3      Absolute Lymphocytes 0.9      Absolute Monocytes 0.8      Absolute Eosinophils 0.0      Absolute Basophils 0.0      Absolute Immature Granulocytes 0.0      Absolute NRBCs " 0.0     ETHYL ALCOHOL LEVEL - Abnormal    Alcohol ethyl 0.29 (*)    LIPASE - Normal    Lipase 20     PHOSPHORUS        Emergency Department Course & Assessments:    Interventions:  Medications   ondansetron (ZOFRAN) injection 4 mg (4 mg Intravenous Given 2/10/23 1214)   OLANZapine zydis (zyPREXA) ODT tab 5-10 mg (has no administration in time range)     Or   haloperidol lactate (HALDOL) injection 2.5-5 mg (has no administration in time range)   flumazenil (ROMAZICON) injection 0.2 mg (has no administration in time range)   melatonin tablet 5 mg (has no administration in time range)   diazepam (VALIUM) tablet 10 mg (has no administration in time range)     Or   diazepam (VALIUM) injection 5-10 mg (has no administration in time range)   sodium chloride 0.9 % 1,000 mL with Infuvite Adult 10 mL, thiamine 100 mg, folic acid 1 mg infusion ( Intravenous New Bag 2/10/23 1112)   diazepam (VALIUM) injection 10 mg (10 mg Intravenous Given 2/10/23 1243)      Independent Interpretation (X-rays, CTs, rhythm strip):  N/A     Assessments/Consultations/Discussion of Management or Tests:  ED Course as of 02/10/23 1259   Fri Feb 10, 2023   1115 I obtained history and examined the  patient.    1246 C/w Dr. Rhoades, hospitalist, who accepts.        Social Determinants of Health affecting care:   See MDM    Disposition:  The patient was admitted to the hospital under the care of Dr. Rhoades.     Impression & Plan      CMS Diagnoses: None    Medical Decision Makin year old male presenting w/ concern for alcohol abuse and withdrawal, positive COVID test at home     Social determinants affecting patient's health include: tobacco and alcohol abuse/dependence increasing risk for poor health outcomes and recurrent ED visits     I reviewed medical records from behavioral health admission on 22 for overview of pt's health, mental health and substance abuse history     DDx includes viral syndrome NOS, influenza-like illness,  influenza, COVID-19, major depressive disorder, generalized anxiety disorder, alcohol abuse, alcohol dependence, electrolyte abnormality.  Labs significant for elevated etoh, COVID-19 positive, minimally elevated AST.  Over the ED course, patient reports feeling increasingly tremulous.  Vital signs are still within normal limits and he does not appear significantly tremulous on my exam.  Time given.  Nursing staff called detox facilities but patient was not accepted given his positive for COVID.  He is subsequently admitted to the hospitalist team for detox and further management.  I discussed the patient's case and presentation with the admitting hospitalist. Pt counseled on all results, disposition and diagnosis.  They are understanding and agreeable to plan. Patient admitted in stable condition.      Diagnosis:    ICD-10-CM    1. Alcohol dependence with unspecified alcohol-induced disorder (H)  F10.29       2. Alcohol withdrawal syndrome without complication (H)  F10.930            Scribe Disclosure:  BRII GALLAGHER, am serving as a scribe at 10:44 AM on 2/10/2023 to document services personally performed by Gutierrez Gonzalez MD based on my observations and the provider's statements to me.   2/10/2023   Gutierrez Gonzalez MD Vaughn, Christopher E, MD  02/10/23 1300

## 2023-02-10 NOTE — H&P
"[facility]    History and Physical  Hospitalist       Date of Admission:  2/10/2023    Assessment & Plan   Jonh Ritter is a 42 year old male who presents with alcohol intoxication, looking for help with detox and while in the emergency room started exhibiting  features of withdrawal.    Alcohol intoxication.  Alcohol dependence.  Acute alcohol withdrawal.  -Patient initially presented to the emergency room via EMS looking for detox help with alcohol problems.  -He was intoxicated with a BAL of 0.29  -Reportedly while in the ER he started exhibiting symptoms of alcohol withdrawal.  His last CIWA score was 12 and received Valium  -He cannot directly go to detox due to being positive for COVID  -He is not as tremulous at the time of my evaluation  -Admit for management of alcohol withdrawal  -CIWA protocol, symptoms triggered benzodiazepine  -Replace electrolytes, multivitamin, thiamine and folic acid  -Chemical dependency consult  -Noted elevated hemoglobin- probably slightly hemoconcentrated, not in the polycythemia range, recheck tomorrow morning    COVID-19 infection  -Patient mentions that \"he was not feeling well\" and tested positive on home COVID testing  -This was confirmed with PCR here in the hospital  -We will check a chest x-ray for baseline  -He is not hypoxic, no indication for COVID specific treatment  -Likely he will not be very ambulatory while he is in active withdrawal, will place him on DVT prophylaxis with Lovenox until he is freely ambulatory.    Chronic medical problems:  Psoriasis    Clinically Significant Risk Factors Present on Admission                                 Medical Decision Making       **CLEAR ALL SELECTIONS**        DVT Prophylaxis: Pneumatic Compression Devices  Code Status: Full Code    Disposition: Expected discharge in less than 2 days    Brenton Rhoades MD, MD    Primary Care Physician   Enmanuel Sandoval    Chief Complaint   Alcohol " withdrawal    History is obtained from the patient    History of Present Illness   Jonh OMER Ritter is a 42 year old male who presents with alcohol intoxication looking for help with detox and while in the emergency room started exhibiting symptoms of alcohol withdrawal.  Patient mentions that he has been drinking up to a liter and a half of hard liquor for the last few days.  On an average he drinks about half a liter.  He also was feeling poorly and apparently tested positive for COVID-19 on home testing.  Endorses nausea, no vomiting.  Denies abdominal pain.  Last bowel movement was 3 days ago.  No hematochezia or melena.  Denies fever or chills.  He does endorse mild cough.  No shortness of breath.  No dysuria urinary urgency or frequency.  He is interested in long-term treatment options for his alcohol dependence once he is detoxified.    In the emergency room the patient was initially found to be intoxicated with a BAL of 0.29.  He also tested positive for COVID-19.  Plan was to discharge him to detox but because of COVID-19 status he will be admitted for management of alcohol withdrawal.    Past Medical History    I have reviewed this patient's medical history and updated it with pertinent information if needed.   Past Medical History:   Diagnosis Date     Alcohol abuse      CARDIOVASCULAR SCREENING; LDL GOAL LESS THAN 160      Psoriasis        Past Surgical History   I have reviewed this patient's surgical history and updated it with pertinent information if needed.  Past Surgical History:   Procedure Laterality Date     SURGICAL HISTORY OF -   1987    Rt Inguinal Hernia Repair       Prior to Admission Medications   Prior to Admission Medications   Prescriptions Last Dose Informant Patient Reported? Taking?   multivitamin w/minerals (THERA-VIT-M) tablet   No No   Sig: Take 1 tablet by mouth daily   thiamine (B-1) 100 MG tablet   No No   Sig: Take 1 tablet (100 mg) by mouth daily   triamcinolone (KENALOG) 0.1 %  external cream   No No   Sig: Apply sparingly to affected area three times daily as needed      Facility-Administered Medications: None     Allergies   Allergies   Allergen Reactions     Penicillins      As infant       Social History   I have reviewed this patient's social history and updated it with pertinent information if needed. Jonh Ritter  reports that he has been smoking cigarettes. He has been smoking an average of 1 pack per day. He has never used smokeless tobacco. He reports current alcohol use of about 7.0 standard drinks per week. He reports that he does not use drugs.    Family History   I have reviewed this patient's family history and updated it with pertinent information if needed.   Family History   Problem Relation Age of Onset     Family History Negative Other        Review of Systems   The 10 point Review of Systems is negative other than noted in the HPI or here.     Physical Exam   Temp: 97.5  F (36.4  C) Temp src: Temporal BP: 125/71 Pulse: 118   Resp: 18 SpO2: 97 % O2 Device: None (Room air)    Vital Signs with Ranges  Temp:  [97.5  F (36.4  C)] 97.5  F (36.4  C)  Pulse:  [118] 118  Resp:  [18] 18  BP: (125-139)/(71-84) 125/71  SpO2:  [97 %-98 %] 97 %  155 lbs 0 oz    I evaluated him after he had received 10 mg of Valium just a while ago.    Gen: AAOX3, drowsy, easily arousable, answers simple questions appropriately  HEENT: Moist oral mucosa, no pallor or icterus  Neck: Supple neck, good ROM, no stridor  Resp: CTA B/L, normal WOB; no crackles; no wheezes  CVS- RRR, no murmur, no edema  Abd/GI: Soft, non-tender. BS- normoactive  Skin- Warm, dry, no rashes  MSK: No joint deformity; no edema  Neuro- CN- intact. Moving X 4; minimal withdrawal    Data   Data reviewed today:  I personally reviewed no images or EKG's today.        Recent Labs   Lab 02/10/23  1045   WBC 6.1   HGB 18.4*   MCV 99         POTASSIUM 4.2   CHLORIDE 96*   CO2 31*   BUN 7.9   CR 0.88   ANIONGAP 14   JANNA  9.4   *   ALBUMIN 4.7   PROTTOTAL 7.9   BILITOTAL 1.3*   ALKPHOS 108   ALT 31   AST 67*   LIPASE 20       No results found for this or any previous visit (from the past 24 hour(s)).

## 2023-02-11 LAB
ALBUMIN SERPL BCG-MCNC: 3.6 G/DL (ref 3.5–5.2)
ALP SERPL-CCNC: 110 U/L (ref 40–129)
ALT SERPL W P-5'-P-CCNC: 60 U/L (ref 10–50)
ANION GAP SERPL CALCULATED.3IONS-SCNC: 13 MMOL/L (ref 7–15)
AST SERPL W P-5'-P-CCNC: 158 U/L (ref 10–50)
BILIRUB DIRECT SERPL-MCNC: 0.33 MG/DL (ref 0–0.3)
BILIRUB SERPL-MCNC: 4.2 MG/DL
BUN SERPL-MCNC: 12.7 MG/DL (ref 6–20)
CALCIUM SERPL-MCNC: 9 MG/DL (ref 8.6–10)
CHLORIDE SERPL-SCNC: 97 MMOL/L (ref 98–107)
CREAT SERPL-MCNC: 0.98 MG/DL (ref 0.67–1.17)
DEPRECATED HCO3 PLAS-SCNC: 29 MMOL/L (ref 22–29)
ERYTHROCYTE [DISTWIDTH] IN BLOOD BY AUTOMATED COUNT: 11.9 % (ref 10–15)
GFR SERPL CREATININE-BSD FRML MDRD: >90 ML/MIN/1.73M2
GLUCOSE BLDC GLUCOMTR-MCNC: 103 MG/DL (ref 70–99)
GLUCOSE BLDC GLUCOMTR-MCNC: 169 MG/DL (ref 70–99)
GLUCOSE SERPL-MCNC: 66 MG/DL (ref 70–99)
HCT VFR BLD AUTO: 42.8 % (ref 40–53)
HGB BLD-MCNC: 15 G/DL (ref 13.3–17.7)
MCH RBC QN AUTO: 35.5 PG (ref 26.5–33)
MCHC RBC AUTO-ENTMCNC: 35 G/DL (ref 31.5–36.5)
MCV RBC AUTO: 101 FL (ref 78–100)
PLATELET # BLD AUTO: 148 10E3/UL (ref 150–450)
POTASSIUM SERPL-SCNC: 4.2 MMOL/L (ref 3.4–5.3)
PROT SERPL-MCNC: 6 G/DL (ref 6.4–8.3)
RBC # BLD AUTO: 4.22 10E6/UL (ref 4.4–5.9)
SODIUM SERPL-SCNC: 139 MMOL/L (ref 136–145)
WBC # BLD AUTO: 5.2 10E3/UL (ref 4–11)

## 2023-02-11 PROCEDURE — 82962 GLUCOSE BLOOD TEST: CPT

## 2023-02-11 PROCEDURE — 250N000013 HC RX MED GY IP 250 OP 250 PS 637: Performed by: INTERNAL MEDICINE

## 2023-02-11 PROCEDURE — 85018 HEMOGLOBIN: CPT | Performed by: INTERNAL MEDICINE

## 2023-02-11 PROCEDURE — 82248 BILIRUBIN DIRECT: CPT | Performed by: INTERNAL MEDICINE

## 2023-02-11 PROCEDURE — 36415 COLL VENOUS BLD VENIPUNCTURE: CPT | Performed by: INTERNAL MEDICINE

## 2023-02-11 PROCEDURE — G0378 HOSPITAL OBSERVATION PER HR: HCPCS

## 2023-02-11 PROCEDURE — 99232 SBSQ HOSP IP/OBS MODERATE 35: CPT | Performed by: INTERNAL MEDICINE

## 2023-02-11 PROCEDURE — 250N000011 HC RX IP 250 OP 636: Performed by: INTERNAL MEDICINE

## 2023-02-11 PROCEDURE — 80053 COMPREHEN METABOLIC PANEL: CPT | Performed by: INTERNAL MEDICINE

## 2023-02-11 PROCEDURE — 96372 THER/PROPH/DIAG INJ SC/IM: CPT | Performed by: INTERNAL MEDICINE

## 2023-02-11 PROCEDURE — 96375 TX/PRO/DX INJ NEW DRUG ADDON: CPT

## 2023-02-11 RX ORDER — POLYETHYLENE GLYCOL 3350 17 G
2 POWDER IN PACKET (EA) ORAL
Status: DISCONTINUED | OUTPATIENT
Start: 2023-02-11 | End: 2023-02-12 | Stop reason: HOSPADM

## 2023-02-11 RX ORDER — GUAIFENESIN 600 MG/1
600 TABLET, EXTENDED RELEASE ORAL 2 TIMES DAILY
Status: DISCONTINUED | OUTPATIENT
Start: 2023-02-11 | End: 2023-02-12 | Stop reason: HOSPADM

## 2023-02-11 RX ADMIN — THIAMINE HCL TAB 100 MG 100 MG: 100 TAB at 13:21

## 2023-02-11 RX ADMIN — NICOTINE 1 PATCH: 21 PATCH, EXTENDED RELEASE TRANSDERMAL at 13:22

## 2023-02-11 RX ADMIN — MULTIPLE VITAMINS W/ MINERALS TAB 1 TABLET: TAB at 13:21

## 2023-02-11 RX ADMIN — GUAIFENESIN 600 MG: 600 TABLET, EXTENDED RELEASE ORAL at 20:53

## 2023-02-11 RX ADMIN — CLONIDINE HYDROCHLORIDE 0.1 MG: 0.1 TABLET ORAL at 13:22

## 2023-02-11 RX ADMIN — CLONIDINE HYDROCHLORIDE 0.1 MG: 0.1 TABLET ORAL at 00:38

## 2023-02-11 RX ADMIN — NICOTINE 1 PATCH: 21 PATCH, EXTENDED RELEASE TRANSDERMAL at 00:35

## 2023-02-11 RX ADMIN — FOLIC ACID 1 MG: 1 TABLET ORAL at 13:22

## 2023-02-11 RX ADMIN — DIAZEPAM 10 MG: 5 TABLET ORAL at 03:35

## 2023-02-11 RX ADMIN — ENOXAPARIN SODIUM 40 MG: 40 INJECTION SUBCUTANEOUS at 17:12

## 2023-02-11 RX ADMIN — PROCHLORPERAZINE EDISYLATE 10 MG: 5 INJECTION INTRAMUSCULAR; INTRAVENOUS at 03:32

## 2023-02-11 RX ADMIN — GABAPENTIN 900 MG: 300 CAPSULE ORAL at 13:22

## 2023-02-11 RX ADMIN — GABAPENTIN 900 MG: 300 CAPSULE ORAL at 00:38

## 2023-02-11 ASSESSMENT — ACTIVITIES OF DAILY LIVING (ADL)
ADLS_ACUITY_SCORE: 31

## 2023-02-11 NOTE — PLAN OF CARE
Goal Outcome Evaluation:       Summary:  ETOH W/D  DATE & TIME: 2300-0730 2300-0730   Cognitive Concerns/ Orientation : A/Ox4 - intermittently resting  - anxious   BEHAVIOR & AGGRESSION TOOL COLOR: green   CIWA SCORE: 8,4,8,3  ABNL VS/O2: VSS RA  MOBILITY: SBA  PAIN MANAGMENT: denies  DIET: regular - poor intake  BOWEL/BLADDER: continent of B/BM   ABNL LAB/BG: HGB 18.4 - [facility] upon admission 0.29   DRAIN/DEVICES: PIV SL   TELEMETRY RHYTHM: N/A  SKIN: Intact/flushed - pt has psoriasis predominantly localized to LLE shin/calf region   TESTS/PROCEDURES: none this shift  D/C DAY/GOALS/PLACE: pending  OTHER IMPORTANT INFO: Special precautions maintained - 10mg PO Valium  administered x1 due to withdrawal symptoms predominantly due to being tremulous. L/S clear and denied SOB/Chest pain.

## 2023-02-11 NOTE — PROGRESS NOTES
"[facility]    Medicine Progress Note - Hospitalist Service    Date of Admission:  2/10/2023    Assessment & Plan   Jonh Ritter is a 42 year old male who presents with alcohol intoxication, looking for help with detox and while in the emergency room started exhibiting  features of withdrawal.     Alcohol intoxication.  Alcohol dependence.  Acute alcohol withdrawal.  -Patient initially presented to the emergency room via EMS looking for detox help with alcohol problems.  -He was intoxicated with a BAL of 0.29  -Reportedly while in the ER he started exhibiting symptoms of alcohol withdrawal.  His last CIWA score was 12 and received Valium  -He cannot directly go to detox due to being positive for COVID  -started on CIWA, symptoms-triggered diazepam, scheduled Clonidine, Gabapentin taper as per protocol  -MVI, Folic acid  -Replace electrolytes  -Chemical dependency consult  - consult; he states that his mother found a rehab place that is covered by his insurance  -Noted elevated hemoglobin- probably slightly hemoconcentrated, not in the polycythemia range, Hb today 15    Elevated transaminase  - AST 67--158. ALT 31--60, total bili 4,2, direct bili 0.33; does not seem to be obstructive pattern  - possible related to alcohol intake   - US abdomen  - LFTs and INR in am    COVID-19 infection  -Patient mentions that \"he was not feeling well\" and tested positive on home COVID testing 1 week ago  -This was confirmed with PCR here in the hospital  -CXR- no infiltrates  -He is not hypoxic, no indication for COVID specific treatment  -reports some cough, requests something to help with sputum expectoration   -Likely he will not be very ambulatory while he is in active withdrawal, will place him on DVT prophylaxis with Lovenox until he is freely ambulatory.     Chronic medical problems:  Psoriasis          Diet: Regular Diet Adult    DVT Prophylaxis: Enoxaparin (Lovenox) SQ  Moa Catheter: Not " present  Lines: None     Cardiac Monitoring: None  Code Status: Full Code      Clinically Significant Risk Factors Present on Admission                               Disposition Plan      Expected Discharge Date: 02/13/2023                  Ruma Corley MD  Hospitalist Service  [facility]  Securely message with [email or web address] (more info)  Text page via rVue Paging/Directory   ______________________________________________________________________    Interval History   Reports feeling better; states that his mother found a rehab facility that is covered by his insurance and he can go there on Monday but he said that he wants to talk with his manager at work first before checking into rehab  - reports some cough and requests an expectorant  - reports not sleeping well, discussed starting some Trazodone prn to see if it helps  - also requested additional nicotine replacement, ordered Nicotine gum/lozenges prn    Physical Exam   Vital Signs: Temp: 98.3  F (36.8  C) Temp src: Oral BP: 124/73 Pulse: 81   Resp: 18 SpO2: 98 % O2 Device: None (Room air)    Weight: 155 lbs 0 oz    Constitutional: awake, alert, cooperative, no apparent distress, and appears stated age  Respiratory: No increased work of breathing, good air exchange, clear to auscultation bilaterally, no crackles or wheezing  Cardiovascular: Normal apical impulse, regular rate and rhythm, normal S1 and S2, no S3 or S4, and no murmur noted  GI: No scars, normal bowel sounds, soft, non-distended, non-tender, no masses palpated, no hepatosplenomegally  Skin: no bruising or bleeding  Musculoskeletal: There is no redness, warmth, or swelling of the joints.  Full range of motion noted.  Motor strength is 5 out of 5 all extremities bilaterally.  Tone is normal.  Neurologic: Awake, alert, oriented to name, place and time.  Cranial nerves II-XII are grossly intact.  Motor is 5 out of 5 bilaterally. Neuropsychiatric: General: normal, calm and  normal eye contact  Level of consciousness: alert / normal  Affect: normal    Medical Decision Making       MANAGEMENT DISCUSSED with the following over the past 24 hours: the patient, bedside RN   Tests ORDERED & REVIEWED in the past 24 hours:  - BMP  - Coags/INR  - LFTs  Tests personally interpreted in the past 24 hours:  - CHEST XRAY showing no infiltrates      Data     I have personally reviewed the following data over the past 24 hrs:    5.2  \   15.0   / 148 (L)     139 97 (L) 12.7 /  103 (H)   4.2 29 0.98 \       ALT: 60 (H) AST: 158 (H) AP: 110 TBILI: 4.2 (H)   ALB: 3.6 TOT PROTEIN: 6.0 (L) LIPASE: N/A       Imaging results reviewed over the past 24 hrs:   No results found for this or any previous visit (from the past 24 hour(s)).

## 2023-02-11 NOTE — PLAN OF CARE
Summary:  ETOH W/D  DATE & TIME: 02/11/23 Day shift  Cognitive Concerns/ Orientation : A/Ox4; sad/crying at times  BEHAVIOR & AGGRESSION TOOL COLOR: GREEN  CIWA SCORE: 2, 2m; No Valium given  ABNL VS/O2: VSS on RA  MOBILITY: Ind  PAIN MANAGMENT: Denies  DIET: Regular - great appetite; NPO at midnight  BOWEL/BLADDER: continent of B/BM; had BM today  ABNL LAB/BG: Liver enzymes elevated; BG was 66, gave apple juice and recheck was 103  DRAIN/DEVICES: PIV SL   TELEMETRY RHYTHM: N/A  SKIN: Intact- has psoriasis rash predominantly localized to LLE shin/calf region   TESTS/PROCEDURES: Will have abdomen US tomorrow AM  D/C DAY/GOALS/PLACE: Will need CD treatment/placement; Patient open to this  OTHER IMPORTANT INFO: Special precautions maintained; LS clear, does have weak cough- mucinex ordered along with IS and acapella.

## 2023-02-11 NOTE — PROGRESS NOTES
Summary:  ETOH W/D  DATE & TIME: 6488-1360   Cognitive Concerns/ Orientation : A/Ox4 - intermittently resting  - anxious   BEHAVIOR & AGGRESSION TOOL COLOR: green   CIWA SCORE: 8,0,0,8  ABNL VS/O2: VSS RA except tacy 104 upon arrival  MOBILITY: SBA  PAIN MANAGMENT: denies  DIET: regular - poor intake  BOWEL/BLADDER: continent of B/BM   ABNL LAB/BG: HGB 18.4 - CHARLES upon admission 0.29   DRAIN/DEVICES: PIV SL   TELEMETRY RHYTHM: N/A  SKIN: Intact/flushed - pt has psoriasis predominantly localized to LLE shin/calf region   TESTS/PROCEDURES: none this shift  D/C DAY/GOALS/PLACE: pending  OTHER IMPORTANT INFO: Special precautions maintained - 10mg PO Valium  administered x2 due to withdrawal symptoms predominantly due to being tremulous. L/S clear and denied SOB/Chest pain.

## 2023-02-11 NOTE — PROVIDER NOTIFICATION
MD Notification    Notified Person: MD    Notified Person Name:YULIGUERITAPAOLA MD    Notification Date/Time:2/11/23 0350am    Notification Interaction:Amcom    Purpose of Notification:622-2 Pt reported weakness and never felt this weak before, c/o nausea gave him Compazine, he is asking if he can get some nutrient in the IV. Can you please advice  Thanks  NANDA RN  *[phone]    Orders Received:    Comments:

## 2023-02-12 ENCOUNTER — APPOINTMENT (OUTPATIENT)
Dept: ULTRASOUND IMAGING | Facility: CLINIC | Age: 43
End: 2023-02-12
Attending: INTERNAL MEDICINE
Payer: COMMERCIAL

## 2023-02-12 VITALS
HEART RATE: 79 BPM | WEIGHT: 155 LBS | DIASTOLIC BLOOD PRESSURE: 82 MMHG | HEIGHT: 74 IN | SYSTOLIC BLOOD PRESSURE: 124 MMHG | OXYGEN SATURATION: 99 % | BODY MASS INDEX: 19.89 KG/M2 | TEMPERATURE: 98.1 F | RESPIRATION RATE: 16 BRPM

## 2023-02-12 LAB
ALBUMIN SERPL BCG-MCNC: 3.5 G/DL (ref 3.5–5.2)
ALP SERPL-CCNC: 111 U/L (ref 40–129)
ALT SERPL W P-5'-P-CCNC: 62 U/L (ref 10–50)
ANION GAP SERPL CALCULATED.3IONS-SCNC: 6 MMOL/L (ref 7–15)
AST SERPL W P-5'-P-CCNC: 116 U/L (ref 10–50)
BILIRUB DIRECT SERPL-MCNC: 0.35 MG/DL (ref 0–0.3)
BILIRUB SERPL-MCNC: 2.6 MG/DL
BUN SERPL-MCNC: 9.9 MG/DL (ref 6–20)
CALCIUM SERPL-MCNC: 9.2 MG/DL (ref 8.6–10)
CHLORIDE SERPL-SCNC: 98 MMOL/L (ref 98–107)
CREAT SERPL-MCNC: 0.91 MG/DL (ref 0.67–1.17)
DEPRECATED HCO3 PLAS-SCNC: 32 MMOL/L (ref 22–29)
GFR SERPL CREATININE-BSD FRML MDRD: >90 ML/MIN/1.73M2
GLUCOSE SERPL-MCNC: 89 MG/DL (ref 70–99)
INR PPP: 1.02 (ref 0.85–1.15)
PLATELET # BLD AUTO: 167 10E3/UL (ref 150–450)
POTASSIUM SERPL-SCNC: 3.9 MMOL/L (ref 3.4–5.3)
PROT SERPL-MCNC: 6.3 G/DL (ref 6.4–8.3)
SODIUM SERPL-SCNC: 136 MMOL/L (ref 136–145)

## 2023-02-12 PROCEDURE — 250N000013 HC RX MED GY IP 250 OP 250 PS 637: Performed by: INTERNAL MEDICINE

## 2023-02-12 PROCEDURE — G0378 HOSPITAL OBSERVATION PER HR: HCPCS

## 2023-02-12 PROCEDURE — 80053 COMPREHEN METABOLIC PANEL: CPT | Performed by: INTERNAL MEDICINE

## 2023-02-12 PROCEDURE — 99238 HOSP IP/OBS DSCHRG MGMT 30/<: CPT | Performed by: INTERNAL MEDICINE

## 2023-02-12 PROCEDURE — 36415 COLL VENOUS BLD VENIPUNCTURE: CPT | Performed by: INTERNAL MEDICINE

## 2023-02-12 PROCEDURE — 85049 AUTOMATED PLATELET COUNT: CPT | Performed by: INTERNAL MEDICINE

## 2023-02-12 PROCEDURE — 85610 PROTHROMBIN TIME: CPT | Performed by: INTERNAL MEDICINE

## 2023-02-12 PROCEDURE — 82248 BILIRUBIN DIRECT: CPT | Performed by: INTERNAL MEDICINE

## 2023-02-12 PROCEDURE — 76705 ECHO EXAM OF ABDOMEN: CPT

## 2023-02-12 RX ORDER — GUAIFENESIN 200 MG/10ML
10 LIQUID ORAL EVERY 4 HOURS PRN
Status: ON HOLD | COMMUNITY
Start: 2023-02-12 | End: 2023-07-04

## 2023-02-12 RX ADMIN — FOLIC ACID 1 MG: 1 TABLET ORAL at 10:07

## 2023-02-12 RX ADMIN — THIAMINE HCL TAB 100 MG 100 MG: 100 TAB at 10:07

## 2023-02-12 RX ADMIN — NICOTINE 1 PATCH: 21 PATCH, EXTENDED RELEASE TRANSDERMAL at 10:13

## 2023-02-12 RX ADMIN — MULTIPLE VITAMINS W/ MINERALS TAB 1 TABLET: TAB at 10:07

## 2023-02-12 RX ADMIN — GUAIFENESIN 600 MG: 600 TABLET, EXTENDED RELEASE ORAL at 10:07

## 2023-02-12 RX ADMIN — CLONIDINE HYDROCHLORIDE 0.1 MG: 0.1 TABLET ORAL at 00:32

## 2023-02-12 RX ADMIN — GABAPENTIN 900 MG: 300 CAPSULE ORAL at 00:32

## 2023-02-12 ASSESSMENT — ACTIVITIES OF DAILY LIVING (ADL)
ADLS_ACUITY_SCORE: 31

## 2023-02-12 NOTE — PROGRESS NOTES
Pt A&O x4. Ind.   CIWA: 2, anxious  VSS  Tolerating oral intake to maintain hydration  Chem dep has NOT seen pt yet.

## 2023-02-12 NOTE — PROGRESS NOTES
Care Management Initial Consult    General Information  Assessment completed with: patient over the phone.  Patient has had an assessment thru [facility]. The recommendation is at minimum IOP.  Patient was planning residential however concerned with the security of his job if he takes a leave.    The [facility] IOP starts at 1700 and patient reports he does not believe his employer will be flexible in his work hours which go to 1700 so now he is looking into the IOP programs which are linked thru Zoom.   Writer suggested he contact [facility] to ask if their IOP can be accessed virtually.  Also gave him the resource of [employer] which has virtual IOP,  [facility] provider list for Substance Use Treatment and other SUDS web sites for resources.   Writer will check also with our [facility] in [place] on Monday to ask if [facility] has virtual IOP or hours which will work better for patient and let patient know via email.              Primary Care Provider verified and updated as needed:     Readmission within the last 30 days:           Advance Care Planning:            Communication Assessment  Patient's communication style: spoken language (English or Bilingual)    Hearing Difficulty or Deaf: no        Cognitive  Cognitive/Neuro/Behavioral: .WDL except  Level of Consciousness: alert  Arousal Level: opens eyes spontaneously  Orientation: oriented x 4  Mood/Behavior: anxious, cooperative  Best Language: 0 - No aphasia  Speech: clear    Living Environment:   People in home:       Current living Arrangements:        Able to return to prior arrangements:         Family/Social Support:  Care provided by:    Provides care for:  self                Description of Support System:           Current Resources:   Patient receiving home care services:       Community Resources:    Equipment currently used at home:    Supplies currently used at home:      Employment/Financial:  Employment Status:          Financial Concerns:             Lifestyle &  Psychosocial Needs:  Social Determinants of Health     Tobacco Use: High Risk     Smoking Tobacco Use: Every Day     Smokeless Tobacco Use: Never     Passive Exposure: Not on file   Alcohol Use: Not on file   Financial Resource Strain: Not on file   Food Insecurity: Not on file   Transportation Needs: Not on file   Physical Activity: Not on file   Stress: Not on file   Social Connections: Not on file   Intimate Partner Violence: Not on file   Depression: Not at risk     PHQ-2 Score: 0   Housing Stability: Not on file       Functional Status:  Prior to admission patient needed assistance:              Mental Health Status:          Chemical Dependency Status:  Seeking substance use treatment                Values/Beliefs:  Spiritual, Cultural Beliefs, Christian Practices, Values that affect care:                 Additional Information:      Bibi Cho, LICSW

## 2023-02-12 NOTE — PLAN OF CARE
Goal Outcome Evaluation:      Plan of Care Reviewed With: patient      Summary:  ETOH W/D  DATE & TIME: 02/12/23 7216-8293  Cognitive Concerns/ Orientation : A/Ox4; mildly anxious, cooperative.  BEHAVIOR & AGGRESSION TOOL COLOR: GREEN  CIWA SCORE: 2, 2  ABNL VS/O2: VSS on RA  MOBILITY: Ind  PAIN MANAGMENT: Denies pain -    DIET: NPO for US Abd today, was on regular diet  BOWEL/BLADDER: continent of B/BM; no BM reported this shift.   ABNL LAB/BG: Liver enzymes elevated; DRAIN/DEVICES: PIV SL   TELEMETRY RHYTHM: N/A  SKIN: Intact- has psoriasis rash predominantly localized to LLE shin/calf region   TESTS/PROCEDURES: Abdomen US today AM d/t elevated liver enzymes  D/C DAY/GOALS/PLACE: Will need CD treatment/placement; Patient open to plan  OTHER IMPORTANT INFO: Special precautions maintained; encouraged IS, scheduled Mucinex. LS clear, infrequent dry cough, denies SOB.

## 2023-02-12 NOTE — PHARMACY-ADMISSION MEDICATION HISTORY
Pharmacy Medication History  Admission medication history interview status for the 2/10/2023  admission is complete. See EPIC admission navigator for prior to admission medications     Location of Interview: Phone  Medication history sources: Patient and fill history     Significant changes made to the medication list:    None     In the past week, patient estimated taking medication this percent of the time: N/A        Additional medication history information:     Patient states he doesn't take any prescription medication other than triamcinolone. He reports he has not started taking multivitamin and B-1.     Medication reconciliation completed by provider prior to medication history? Yes    Time spent in this activity: 10 minutes     Prior to Admission medications    Medication Sig Last Dose Taking? Auth Provider Long Term End Date                     triamcinolone (KENALOG) 0.1 % external cream Apply sparingly to affected area three times daily as needed  Yes Reid Garcia MD     multivitamin w/minerals (THERA-VIT-M) tablet Take 1 tablet by mouth daily   Moises Link MD     thiamine (B-1) 100 MG tablet Take 1 tablet (100 mg) by mouth daily   Moises Link MD         The information provided in this note is only as accurate as the sources available at the time of update(s)   Abi Anne PharmD

## 2023-02-12 NOTE — DISCHARGE SUMMARY
[facility]  Hospitalist Discharge Summary      Date of Admission:  2/10/2023  Date of Discharge:  2/12/2023  3:17 PM  Discharging Provider: Ruma Corley MD  Discharge Service: Hospitalist Service    Discharge Diagnoses   Alcohol intoxication.  Alcohol dependence.  Acute alcohol withdrawal  Elevated transaminase  Covid 19 infection    Follow-ups Needed After Discharge   Follow-up Appointments     Follow-up and recommended labs and tests       Follow up with primary care provider, Enmanuel Sandoval, within 7 days for   hospital follow- up.  The following labs/tests are recommended: LFTs.    Follow up with outpatient rehab program.             Unresulted Labs Ordered in the Past 30 Days of this Admission     No orders found from 1/11/2023 to 2/11/2023.      None    Discharge Disposition   Discharged to home  Condition at discharge: Good      Hospital Course   Jonh Ritter is a 42 year old male who presents with alcohol intoxication, looking for help with detox and while in the emergency room started exhibiting  features of withdrawal; for a detailed HPI- please refer to H&P done by Dr Brenton Rhoades on 02/10/2023.     Alcohol intoxication.  Alcohol dependence.  Acute alcohol withdrawal.  -Patient initially presented to the emergency room via EMS looking for detox help with alcohol problems.  -He was intoxicated with a BAL of 0.29  -Reportedly while in the ER he started exhibiting symptoms of alcohol withdrawal; CIWA score was 12 and received Valium  -He cannot directly go to detox due to being positive for COVID so he was admitted under Observation status.  -started on CIWA, symptoms-triggered diazepam, scheduled Clonidine, Gabapentin taper as per protocol  -MVI, Folic acid  -electrolytes -WNL, no need for replacement  -he started feeling better, much less tremulous, no need for benzodiazepines in the last 48h  -Chemical dependency consult  - initially- he stated that his mother found a  "inpatient rehab place that is covered by his insurance; next day- he said that he was worried about losing his job so he prefers to go to outpatient program; he said that a close friend is looking actively to find a outpt rehab program  -strongly encouraged him to pursue rehab treatment.after discharge.   - SW consult aprreciated  - Noted elevated hemoglobin- probably slightly hemoconcentrated, not in the polycythemia range, repeat Hb 15     Elevated transaminase, likely related to alcohol abuse  Fatty liver  - AST 67--158. ALT 31--60, total bili 4,2, direct bili 0.33; does not seem to be obstructive pattern; INR 1.02  - likely related to alcohol intake   - US abdomen- Gallbladder sludge without evidence of acute cholecystitis. Hepatic steatosis  - repeat LFTs- ALT 62,   - follow up with PCP and repeat LFTS    COVID-19 infection  -Patient mentions that \"he was not feeling well\" and tested positive on home COVID testing 1 week ago  -This was confirmed with PCR here in the hospital  -CXR- no infiltrates  -He is not hypoxic, no indication for COVID specific treatment  -reports some cough, requests something to help with sputum expectoration   -Lovenox for DVT/prophylaxis while he is in the hospital     Chronic medical problems:  Psoriasis    Consultations This Hospital Stay   CHEMICAL DEPENDENCY IP CONSULT  SOCIAL WORK IP CONSULT    Code Status   Full Code    Time Spent on this Encounter   I, Ruma Corley MD, personally saw the patient today and spent less than or equal to 30 minutes discharging this patient.       Ruma Corley MD  [place] [address] [facility]  [facility] [place] [address]  Phone: [phone]  ______________________________________________________________________    Physical Exam   Vital Signs: Temp: 98.1  F (36.7  C) Temp src: Oral BP: 124/82 Pulse: 79   Resp: 16 SpO2: 99 % O2 Device: None (Room air)    Weight: 155 lbs 0 oz     Constitutional: " awake, alert, cooperative, no apparent distress, and appears stated age  Respiratory: No increased work of breathing, good air exchange, clear to auscultation bilaterally, no crackles or wheezing  Cardiovascular: Normal apical impulse, regular rate and rhythm, normal S1 and S2, no S3 or S4, and no murmur noted  GI: No scars, normal bowel sounds, soft, non-distended, non-tender, no masses palpated, no hepatosplenomegally  Skin: no bruising or bleeding  Musculoskeletal: There is no redness, warmth, or swelling of the joints.  Full range of motion noted.  Motor strength is 5 out of 5 all extremities bilaterally.  Tone is normal.  Neurologic: Awake, alert, oriented to name, place and time.  Cranial nerves II-XII are grossly intact.  Motor is 5 out of 5 bilaterally.   Neuropsychiatric: General: normal, calm and normal eye contact            Primary Care Physician   Enmanuel Sanodval    Discharge Orders      Reason for your hospital stay    Alcohol withdrawals     Follow-up and recommended labs and tests     Follow up with primary care provider, Enmanuel Sandoval, within 7 days for hospital follow- up.  The following labs/tests are recommended: LFTs.    Follow up with outpatient rehab program.     Activity    Your activity upon discharge: activity as tolerated     When to contact your care team    Call your primary doctor if you have any of the following: temperature greater than 100.5 or less than 95, chills, increased anxiety, withdrawal symptoms, dizziness, loss of consciousness, chest pain, shortness of breath. Nausea, vomiting, abdominal pain.     Diet    Follow this diet upon discharge: Orders Placed This Encounter      Regular Diet Adult       Significant Results and Procedures   Most Recent 3 CBC's:Recent Labs   Lab Test 02/12/23  1305 02/11/23  0935 02/10/23  1045 12/30/22  1343   WBC  --  5.2 6.1 5.6   HGB  --  15.0 18.4* 16.6   MCV  --  101* 99 101*    148* 236 242     Most Recent 3 BMP's:Recent Labs   Lab Test  02/12/23  0913 02/11/23 2218 02/11/23  1106 02/11/23  0935 02/10/23  1045     --   --  139 141   POTASSIUM 3.9  --   --  4.2 4.2   CHLORIDE 98  --   --  97* 96*   CO2 32*  --   --  29 31*   BUN 9.9  --   --  12.7 7.9   CR 0.91  --   --  0.98 0.88   ANIONGAP 6*  --   --  13 14   JANNA 9.2  --   --  9.0 9.4   GLC 89 169* 103* 66* 101*     Most Recent 2 LFT's:Recent Labs   Lab Test 02/12/23  0913 02/11/23  0935   * 158*   ALT 62* 60*   ALKPHOS 111 110   BILITOTAL 2.6* 4.2*     Most Recent 3 INR's:Recent Labs   Lab Test 02/12/23 0913   INR 1.02     Most Recent 3 Creatinines:Recent Labs   Lab Test 02/12/23  0913 02/11/23  0935 02/10/23  1045   CR 0.91 0.98 0.88     Most Recent 3 Hemoglobins:Recent Labs   Lab Test 02/11/23  0935 02/10/23  1045 12/30/22  1343   HGB 15.0 18.4* 16.6     Most Recent TSH and T4:No lab results found.  Most Recent 6 glucoses:Recent Labs   Lab Test 02/12/23  0913 02/11/23 2218 02/11/23  1106 02/11/23  0935 02/10/23  1045 12/30/22  1343   GLC 89 169* 103* 66* 101* 106*   ,   Results for orders placed or performed during the hospital encounter of 02/10/23   XR Chest Port 1 View    Narrative    XR CHEST PORT 1 VIEW   2/10/2023 1:48 PM     HISTORY: COVID-19 infection, nausea.    COMPARISON: None.      Impression    IMPRESSION: No acute cardiopulmonary disease.    BO LACKEY MD         SYSTEM ID:  [id number]   US Abdomen Limited    Narrative    EXAM: US ABDOMEN LIMITED  LOCATION: [facility]  DATE/TIME: 2/12/2023 8:34 AM    INDICATION: elevated LFTs  COMPARISON: None.  TECHNIQUE: Limited abdominal ultrasound.    FINDINGS:    GALLBLADDER: The gallbladder is well-distended with a thin wall. Gallbladder sludge. No shadowing stone. No pericholecystic fluid. The sonographic Vasquez sign is negative.    BILE DUCTS: No biliary dilatation. The common duct measures 4 mm.    LIVER: Increased echogenicity from diffuse fatty infiltration. No focal mass. A Riedel's  lobe.    RIGHT KIDNEY: No hydronephrosis.    PANCREAS: The visualized portions are normal.    No ascites.      Impression    IMPRESSION:  1.  Gallbladder sludge without evidence of acute cholecystitis.  2.  Hepatic steatosis.             Discharge Medications   Current Discharge Medication List      START taking these medications    Details   guaiFENesin (ROBITUSSIN) 20 mg/mL liquid Take 10 mLs by mouth every 4 hours as needed for cough    Associated Diagnoses: Infection due to 2019 novel coronavirus      melatonin 5 MG tablet Take 1 tablet (5 mg) by mouth every evening as needed for sleep    Associated Diagnoses: Alcohol abuse         CONTINUE these medications which have NOT CHANGED    Details   multivitamin w/minerals (THERA-VIT-M) tablet Take 1 tablet by mouth daily  Qty: 30 tablet, Refills: 0    Associated Diagnoses: Alcohol abuse      thiamine (B-1) 100 MG tablet Take 1 tablet (100 mg) by mouth daily  Qty: 30 tablet, Refills: 0    Associated Diagnoses: Alcohol abuse      triamcinolone (KENALOG) 0.1 % external cream Apply sparingly to affected area three times daily as needed  Qty: 240 g, Refills: 3    Associated Diagnoses: Psoriasis           Allergies   Allergies   Allergen Reactions     Penicillins      As infant

## 2023-02-12 NOTE — PLAN OF CARE
Goal Outcome Evaluation:    Discharge Note    Patient discharged to home via private vehicle  accompanied by friend.  IV: Discontinued  Prescriptions N/A.   Belongings reviewed and sent with patient.   Home medications returned to patient: NA  Equipment sent with: N/A.   patient verbalizes understanding of discharge instructions. AVS given to patient.    Pt walked out independently and was escorted down to door six where friend was there to pick him up. RN walked him down.

## 2023-02-12 NOTE — CONSULTS
Care Management Initial Consult    General Information  Assessment completed with: patient over the phone.  Patient has had an assessment thru [facility]. The recommendation is at minimum IOP.  Patient was planning residential however concerned with the security of his job if he takes a leave.    The [facility] IOP starts at 1700 and patient reports he does not believe his employer will be flexible in his work hours which go to 1700 so now he is looking into the IOP programs which are linked thru Zoom.   Writer suggested he contact [facility] to ask if their IOP can be accessed virtually.  Also gave him the resource of [employer] which has virtual IOP,  [facility] provider list for Substance Use Treatment and other SUDS web sites for resources.   Writer will check also with our [facility] on Monday to ask if [facility] has virtual IOP or hours which will work better for patient and let patient know via email.              Primary Care Provider verified and updated as needed:     Readmission within the last 30 days:           Advance Care Planning:            Communication Assessment  Patient's communication style: spoken language (English or Bilingual)    Hearing Difficulty or Deaf: no        Cognitive  Cognitive/Neuro/Behavioral: .WDL except  Level of Consciousness: alert  Arousal Level: opens eyes spontaneously  Orientation: oriented x 4  Mood/Behavior: anxious, cooperative  Best Language: 0 - No aphasia  Speech: clear    Living Environment:   People in home:       Current living Arrangements:        Able to return to prior arrangements:         Family/Social Support:  Care provided by:    Provides care for:  self                Description of Support System:           Current Resources:   Patient receiving home care services:       Community Resources:    Equipment currently used at home:    Supplies currently used at home:      Employment/Financial:  Employment Status:          Financial Concerns:             Lifestyle &  Psychosocial Needs:  Social Determinants of Health     Tobacco Use: High Risk     Smoking Tobacco Use: Every Day     Smokeless Tobacco Use: Never     Passive Exposure: Not on file   Alcohol Use: Not on file   Financial Resource Strain: Not on file   Food Insecurity: Not on file   Transportation Needs: Not on file   Physical Activity: Not on file   Stress: Not on file   Social Connections: Not on file   Intimate Partner Violence: Not on file   Depression: Not at risk     PHQ-2 Score: 0   Housing Stability: Not on file       Functional Status:  Prior to admission patient needed assistance:              Mental Health Status:          Chemical Dependency Status:  Seeking substance use treatment                Values/Beliefs:  Spiritual, Cultural Beliefs, Druze Practices, Values that affect care:                 Additional Information:      Bibi Cho, LICSW

## 2023-02-12 NOTE — PLAN OF CARE
Goal Outcome Evaluation:    Summary:  ETOH W/D  DATE & TIME: 02/11/23 8610-9862  Cognitive Concerns/ Orientation : A/Ox4; anxious during care and assessment - cooperative  BEHAVIOR & AGGRESSION TOOL COLOR: GREEN  CIWA SCORE: 0,3,0 - significantly less tremulous over 24hr timeframe - Valium not administered this shift  ABNL VS/O2: VSS on RA  MOBILITY: Ind  PAIN MANAGMENT: Denies pain -     DIET: Regular - fair  appetite; NPO at midnight  BOWEL/BLADDER: continent of B/BM; had BM this shfit  ABNL LAB/BG: Liver enzymes elevated; -  (checked for preventative measures d/t AM BG of 66 and prior to NPO status)   DRAIN/DEVICES: PIV SL   TELEMETRY RHYTHM: N/A  SKIN: Intact- has psoriasis rash predominantly localized to LLE shin/calf region   TESTS/PROCEDURES: Abdomen US tomorrow AM d/t elevated liver enzymes  D/C DAY/GOALS/PLACE: Will need CD treatment/placement; Patient open to plan  OTHER IMPORTANT INFO: Special precautions maintained; - scheduled mucinex provided along with incentive spirometer - LS clear and cough not witnessed - pt denies SOB/Chest pain.

## 2023-02-12 NOTE — DISCHARGE INSTRUCTIONS
Resources   SMART RECOVERY https://smartrecovery.org  Alcoholics Annoymous  https://www.aa.org  And https:// [place].org and a[place].org  [place] [facility]  Https://[place]nesotarecovery.org  [facility]  (IOP) intake line is [phone]  See attached [facility] list.

## 2023-02-14 ENCOUNTER — PATIENT OUTREACH (OUTPATIENT)
Dept: CARE COORDINATION | Facility: CLINIC | Age: 43
End: 2023-02-14
Payer: COMMERCIAL

## 2023-02-14 NOTE — PROGRESS NOTES
"Asked by [facility] staff to outreach to patient. Patient states he would like nausea medication that he was given in hospital. He describes \"brain fog\" and wonders what to do. Advised could be multi-factorial as far as cause and encouraged good sleep, fluids, rest and good diet (patient admits to not eating well and not taking \"care of myself\"). States he would also like medication to help with anxiety. States he was prescribed valium but doesn't like how he feels when taking this. Completed intake for [facility] program. Work is supportive. Advised will need follow up with PCP to discuss possibility of medications and for hospital follow up. Patient states he no longer lives in [place] and has new insurance and isn't sure to establish with. Advised to contact insurance company for covered options for primary care provider. 24/7 nurse triage phone number provided for patient for medical needs or to contact for establishing care with a [facility] provider if covered by insurance.     Monalisa Bajwa RN  [facility], [facility]    "

## 2023-02-14 NOTE — PROGRESS NOTES
"Clinic Care Coordination Contact  [facility]: Post-Discharge Note  The patient stated that he has a question regarding his brain fog he is experiencing from his withdrawals. He stated that he is not worried about his safety and is looking for any advice. He stated that at this time he went out and bought some tea to try and help with the fog or \"fuzzy\" feeling he is experiencing. The CHW offered to have an RN call the patient and he was willing to discuss these symptoms. The CHW was able to reach out to her team and an RN is able to call the patient to discuss. At this time no other needs from the CHW.  SITUATION                                                      Admission:    Admission Date: 02/10/23   Reason for Admission: Alcohol intoxication.  Alcohol dependence.  Acute alcohol withdrawal  Elevated transaminase  Covid 19 infection  Discharge:   Discharge Date: 02/12/23  Discharge Diagnosis: Alcohol intoxication.  Alcohol dependence.  Acute alcohol withdrawal  Elevated transaminase  Covid 19 infection    BACKGROUND                                                      Per hospital discharge summary and inpatient provider notes:    Jonh Ritter is a 42 year old male who presents with alcohol intoxication looking for help with detox and while in the emergency room started exhibiting symptoms of alcohol withdrawal.  Patient mentions that he has been drinking up to a liter and a half of hard liquor for the last few days.  On an average he drinks about half a liter.  He also was feeling poorly and apparently tested positive for COVID-19 on home testing.  Endorses nausea, no vomiting.  Denies abdominal pain.  Last bowel movement was 3 days ago.  No hematochezia or melena.  Denies fever or chills.  He does endorse mild cough.  No shortness of breath.  No dysuria urinary urgency or frequency.  He is interested in long-term treatment options for his alcohol dependence once he is detoxified.     In the emergency " "room the patient was initially found to be intoxicated with a BAL of 0.29.  He also tested positive for COVID-19.  Plan was to discharge him to detox but because of COVID-19 status he will be admitted for management of alcohol withdrawal.    ASSESSMENT      Discharge Assessment  How are you doing now that you are home?: \"Getting better, chemical dependency enrolled in intense outpatient therapy, going through withdrawls\"  How are your symptoms? (Red Flag symptoms escalate to triage hotline per guidelines): Improved  Do you feel your condition is stable enough to be safe at home until your provider visit?: Yes  Does the patient have their discharge instructions? : Yes  Does the patient have questions regarding their discharge instructions? : No  Were you started on any new medications or were there changes to any of your previous medications? : Yes  Does the patient have all of their medications?: Yes  Do you have questions regarding any of your medications? : No  Do you have all of your needed medical supplies or equipment (DME)?  (i.e. oxygen tank, CPAP, cane, etc.): Yes  Discharge follow-up appointment scheduled within 14 calendar days? : No  Is patient agreeable to assistance with scheduling? : No    Post-op (W CTA Only)  If the patient had a surgery or procedure, do they have any questions for a nurse?: Yes (see comment) (The patient would like to talk to an RN to ask about a symptom of his withdrawals (Brain fog). He stated that he got some tea and is looking for some advice.)    PLAN                                                      Outpatient Plan:    Follow up with primary care provider, Enmanuel Sandoval, within 7 days for hospital follow- up.  The following labs/tests are recommended: LFTs.    Follow up with outpatient rehab program.    No future appointments.      For any urgent concerns, please contact our 24 hour nurse triage line: [phone] ([phone])         RUTH Abdul  [phone]  Connected " [facility]

## 2023-06-17 ENCOUNTER — HEALTH MAINTENANCE LETTER (OUTPATIENT)
Age: 43
End: 2023-06-17

## 2023-07-04 ENCOUNTER — HOSPITAL ENCOUNTER (INPATIENT)
Facility: CLINIC | Age: 43
LOS: 1 days | Discharge: HOME | DRG: 897 | End: 2023-07-04
Attending: EMERGENCY MEDICINE | Admitting: INTERNAL MEDICINE
Payer: COMMERCIAL

## 2023-07-04 VITALS
DIASTOLIC BLOOD PRESSURE: 68 MMHG | SYSTOLIC BLOOD PRESSURE: 106 MMHG | RESPIRATION RATE: 16 BRPM | TEMPERATURE: 98.2 F | HEART RATE: 94 BPM | OXYGEN SATURATION: 95 %

## 2023-07-04 DIAGNOSIS — F10.939 ALCOHOL WITHDRAWAL SYNDROME WITH COMPLICATION (H): ICD-10-CM

## 2023-07-04 LAB
ALBUMIN SERPL BCG-MCNC: 4.7 G/DL (ref 3.5–5.2)
ALP SERPL-CCNC: 83 U/L (ref 40–129)
ALT SERPL W P-5'-P-CCNC: 25 U/L (ref 0–70)
ANION GAP SERPL CALCULATED.3IONS-SCNC: 15 MMOL/L (ref 7–15)
AST SERPL W P-5'-P-CCNC: 32 U/L (ref 0–45)
BASOPHILS # BLD AUTO: 0.1 10E3/UL (ref 0–0.2)
BASOPHILS NFR BLD AUTO: 1 %
BILIRUB SERPL-MCNC: 1.3 MG/DL
BUN SERPL-MCNC: 4 MG/DL (ref 6–20)
CALCIUM SERPL-MCNC: 9.3 MG/DL (ref 8.6–10)
CHLORIDE SERPL-SCNC: 101 MMOL/L (ref 98–107)
CREAT SERPL-MCNC: 0.9 MG/DL (ref 0.67–1.17)
DEPRECATED HCO3 PLAS-SCNC: 28 MMOL/L (ref 22–29)
EOSINOPHIL # BLD AUTO: 0.1 10E3/UL (ref 0–0.7)
EOSINOPHIL NFR BLD AUTO: 1 %
ERYTHROCYTE [DISTWIDTH] IN BLOOD BY AUTOMATED COUNT: 12.3 % (ref 10–15)
ETHANOL SERPL-MCNC: 0.23 G/DL
GFR SERPL CREATININE-BSD FRML MDRD: >90 ML/MIN/1.73M2
GLUCOSE SERPL-MCNC: 102 MG/DL (ref 70–99)
HCT VFR BLD AUTO: 47.4 % (ref 40–53)
HGB BLD-MCNC: 16.6 G/DL (ref 13.3–17.7)
HOLD SPECIMEN: NORMAL
HOLD SPECIMEN: NORMAL
IMM GRANULOCYTES # BLD: 0 10E3/UL
IMM GRANULOCYTES NFR BLD: 0 %
LYMPHOCYTES # BLD AUTO: 1.1 10E3/UL (ref 0.8–5.3)
LYMPHOCYTES NFR BLD AUTO: 14 %
MAGNESIUM SERPL-MCNC: 2.2 MG/DL (ref 1.7–2.3)
MCH RBC QN AUTO: 35.2 PG (ref 26.5–33)
MCHC RBC AUTO-ENTMCNC: 35 G/DL (ref 31.5–36.5)
MCV RBC AUTO: 101 FL (ref 78–100)
MONOCYTES # BLD AUTO: 0.7 10E3/UL (ref 0–1.3)
MONOCYTES NFR BLD AUTO: 10 %
NEUTROPHILS # BLD AUTO: 5.7 10E3/UL (ref 1.6–8.3)
NEUTROPHILS NFR BLD AUTO: 74 %
NRBC # BLD AUTO: 0 10E3/UL
NRBC BLD AUTO-RTO: 0 /100
PHOSPHATE SERPL-MCNC: 3.5 MG/DL (ref 2.5–4.5)
PLATELET # BLD AUTO: 255 10E3/UL (ref 150–450)
POTASSIUM SERPL-SCNC: 3.4 MMOL/L (ref 3.4–5.3)
PROT SERPL-MCNC: 7.4 G/DL (ref 6.4–8.3)
RBC # BLD AUTO: 4.71 10E6/UL (ref 4.4–5.9)
SODIUM SERPL-SCNC: 144 MMOL/L (ref 136–145)
WBC # BLD AUTO: 7.7 10E3/UL (ref 4–11)

## 2023-07-04 PROCEDURE — G0378 HOSPITAL OBSERVATION PER HR: HCPCS

## 2023-07-04 PROCEDURE — 96375 TX/PRO/DX INJ NEW DRUG ADDON: CPT

## 2023-07-04 PROCEDURE — 99285 EMERGENCY DEPT VISIT HI MDM: CPT | Mod: 25

## 2023-07-04 PROCEDURE — 83735 ASSAY OF MAGNESIUM: CPT | Performed by: EMERGENCY MEDICINE

## 2023-07-04 PROCEDURE — 258N000003 HC RX IP 258 OP 636: Performed by: INTERNAL MEDICINE

## 2023-07-04 PROCEDURE — HZ2ZZZZ DETOXIFICATION SERVICES FOR SUBSTANCE ABUSE TREATMENT: ICD-10-PCS | Performed by: INTERNAL MEDICINE

## 2023-07-04 PROCEDURE — 250N000011 HC RX IP 250 OP 636: Mod: JZ | Performed by: INTERNAL MEDICINE

## 2023-07-04 PROCEDURE — 120N000001 HC R&B MED SURG/OB

## 2023-07-04 PROCEDURE — 250N000013 HC RX MED GY IP 250 OP 250 PS 637: Performed by: HOSPITALIST

## 2023-07-04 PROCEDURE — 250N000011 HC RX IP 250 OP 636: Performed by: EMERGENCY MEDICINE

## 2023-07-04 PROCEDURE — 250N000013 HC RX MED GY IP 250 OP 250 PS 637: Performed by: INTERNAL MEDICINE

## 2023-07-04 PROCEDURE — 80053 COMPREHEN METABOLIC PANEL: CPT | Performed by: EMERGENCY MEDICINE

## 2023-07-04 PROCEDURE — 258N000003 HC RX IP 258 OP 636: Performed by: EMERGENCY MEDICINE

## 2023-07-04 PROCEDURE — 99235 HOSP IP/OBS SAME DATE MOD 70: CPT | Performed by: INTERNAL MEDICINE

## 2023-07-04 PROCEDURE — 96374 THER/PROPH/DIAG INJ IV PUSH: CPT

## 2023-07-04 PROCEDURE — 99207 PR APP CREDIT; MD BILLING SHARED VISIT: CPT | Performed by: PHYSICIAN ASSISTANT

## 2023-07-04 PROCEDURE — 250N000009 HC RX 250: Performed by: EMERGENCY MEDICINE

## 2023-07-04 PROCEDURE — 85025 COMPLETE CBC W/AUTO DIFF WBC: CPT | Performed by: EMERGENCY MEDICINE

## 2023-07-04 PROCEDURE — 84100 ASSAY OF PHOSPHORUS: CPT | Performed by: INTERNAL MEDICINE

## 2023-07-04 PROCEDURE — 36415 COLL VENOUS BLD VENIPUNCTURE: CPT | Performed by: EMERGENCY MEDICINE

## 2023-07-04 PROCEDURE — 96361 HYDRATE IV INFUSION ADD-ON: CPT

## 2023-07-04 PROCEDURE — 82077 ASSAY SPEC XCP UR&BREATH IA: CPT | Performed by: EMERGENCY MEDICINE

## 2023-07-04 RX ORDER — OLANZAPINE 5 MG/1
5-10 TABLET, ORALLY DISINTEGRATING ORAL EVERY 6 HOURS PRN
Status: DISCONTINUED | OUTPATIENT
Start: 2023-07-04 | End: 2023-07-04 | Stop reason: HOSPADM

## 2023-07-04 RX ORDER — AMOXICILLIN 250 MG
2 CAPSULE ORAL 2 TIMES DAILY PRN
Status: DISCONTINUED | OUTPATIENT
Start: 2023-07-04 | End: 2023-07-04 | Stop reason: HOSPADM

## 2023-07-04 RX ORDER — FOLIC ACID 1 MG/1
1 TABLET ORAL DAILY
Status: DISCONTINUED | OUTPATIENT
Start: 2023-07-05 | End: 2023-07-04 | Stop reason: HOSPADM

## 2023-07-04 RX ORDER — FLUMAZENIL 0.1 MG/ML
0.2 INJECTION, SOLUTION INTRAVENOUS
Status: DISCONTINUED | OUTPATIENT
Start: 2023-07-04 | End: 2023-07-04

## 2023-07-04 RX ORDER — DIAZEPAM 10 MG/2ML
5-10 INJECTION, SOLUTION INTRAMUSCULAR; INTRAVENOUS EVERY 30 MIN PRN
Status: DISCONTINUED | OUTPATIENT
Start: 2023-07-04 | End: 2023-07-04

## 2023-07-04 RX ORDER — AMOXICILLIN 250 MG
1 CAPSULE ORAL 2 TIMES DAILY PRN
Status: DISCONTINUED | OUTPATIENT
Start: 2023-07-04 | End: 2023-07-04 | Stop reason: HOSPADM

## 2023-07-04 RX ORDER — FLUMAZENIL 0.1 MG/ML
0.2 INJECTION, SOLUTION INTRAVENOUS
Status: DISCONTINUED | OUTPATIENT
Start: 2023-07-04 | End: 2023-07-04 | Stop reason: HOSPADM

## 2023-07-04 RX ORDER — MULTIPLE VITAMINS W/ MINERALS TAB 9MG-400MCG
1 TAB ORAL DAILY
Status: DISCONTINUED | OUTPATIENT
Start: 2023-07-04 | End: 2023-07-04

## 2023-07-04 RX ORDER — ONDANSETRON 4 MG/1
4 TABLET, ORALLY DISINTEGRATING ORAL EVERY 6 HOURS PRN
Status: DISCONTINUED | OUTPATIENT
Start: 2023-07-04 | End: 2023-07-04 | Stop reason: HOSPADM

## 2023-07-04 RX ORDER — HALOPERIDOL 5 MG/ML
2.5-5 INJECTION INTRAMUSCULAR EVERY 6 HOURS PRN
Status: DISCONTINUED | OUTPATIENT
Start: 2023-07-04 | End: 2023-07-04 | Stop reason: HOSPADM

## 2023-07-04 RX ORDER — NICOTINE 21 MG/24HR
1 PATCH, TRANSDERMAL 24 HOURS TRANSDERMAL DAILY
Status: DISCONTINUED | OUTPATIENT
Start: 2023-07-04 | End: 2023-07-04

## 2023-07-04 RX ORDER — SODIUM CHLORIDE 9 MG/ML
INJECTION, SOLUTION INTRAVENOUS CONTINUOUS
Status: DISCONTINUED | OUTPATIENT
Start: 2023-07-04 | End: 2023-07-04 | Stop reason: HOSPADM

## 2023-07-04 RX ORDER — NICOTINE 21 MG/24HR
1 PATCH, TRANSDERMAL 24 HOURS TRANSDERMAL DAILY
Status: DISCONTINUED | OUTPATIENT
Start: 2023-07-04 | End: 2023-07-04 | Stop reason: HOSPADM

## 2023-07-04 RX ORDER — LIDOCAINE 40 MG/G
CREAM TOPICAL
Status: DISCONTINUED | OUTPATIENT
Start: 2023-07-04 | End: 2023-07-04 | Stop reason: HOSPADM

## 2023-07-04 RX ORDER — NICOTINE 21 MG/24HR
1 PATCH, TRANSDERMAL 24 HOURS TRANSDERMAL EVERY 24 HOURS
Status: DISCONTINUED | OUTPATIENT
Start: 2023-07-04 | End: 2023-07-04

## 2023-07-04 RX ORDER — DIAZEPAM 5 MG
10 TABLET ORAL EVERY 30 MIN PRN
Status: DISCONTINUED | OUTPATIENT
Start: 2023-07-04 | End: 2023-07-04

## 2023-07-04 RX ORDER — LORAZEPAM 1 MG/1
1-2 TABLET ORAL EVERY 30 MIN PRN
Status: DISCONTINUED | OUTPATIENT
Start: 2023-07-04 | End: 2023-07-04 | Stop reason: HOSPADM

## 2023-07-04 RX ORDER — ACETAMINOPHEN 650 MG/1
650 SUPPOSITORY RECTAL EVERY 6 HOURS PRN
Status: DISCONTINUED | OUTPATIENT
Start: 2023-07-04 | End: 2023-07-04 | Stop reason: HOSPADM

## 2023-07-04 RX ORDER — LORAZEPAM 2 MG/ML
1 INJECTION INTRAMUSCULAR ONCE
Status: COMPLETED | OUTPATIENT
Start: 2023-07-04 | End: 2023-07-04

## 2023-07-04 RX ORDER — ONDANSETRON 2 MG/ML
4 INJECTION INTRAMUSCULAR; INTRAVENOUS EVERY 6 HOURS PRN
Status: DISCONTINUED | OUTPATIENT
Start: 2023-07-04 | End: 2023-07-04 | Stop reason: HOSPADM

## 2023-07-04 RX ORDER — MULTIPLE VITAMINS W/ MINERALS TAB 9MG-400MCG
1 TAB ORAL DAILY
Status: DISCONTINUED | OUTPATIENT
Start: 2023-07-05 | End: 2023-07-04 | Stop reason: HOSPADM

## 2023-07-04 RX ORDER — ACETAMINOPHEN 325 MG/1
650 TABLET ORAL EVERY 6 HOURS PRN
Status: DISCONTINUED | OUTPATIENT
Start: 2023-07-04 | End: 2023-07-04 | Stop reason: HOSPADM

## 2023-07-04 RX ORDER — LORAZEPAM 2 MG/ML
1-2 INJECTION INTRAMUSCULAR EVERY 30 MIN PRN
Status: DISCONTINUED | OUTPATIENT
Start: 2023-07-04 | End: 2023-07-04 | Stop reason: HOSPADM

## 2023-07-04 RX ADMIN — SODIUM CHLORIDE: 9 INJECTION, SOLUTION INTRAVENOUS at 09:15

## 2023-07-04 RX ADMIN — LORAZEPAM 1 MG: 2 INJECTION INTRAMUSCULAR; INTRAVENOUS at 06:38

## 2023-07-04 RX ADMIN — FOLIC ACID: 5 INJECTION, SOLUTION INTRAMUSCULAR; INTRAVENOUS; SUBCUTANEOUS at 07:38

## 2023-07-04 RX ADMIN — NICOTINE 1 PATCH: 14 PATCH, EXTENDED RELEASE TRANSDERMAL at 13:44

## 2023-07-04 RX ADMIN — SODIUM CHLORIDE 1000 ML: 9 INJECTION, SOLUTION INTRAVENOUS at 06:00

## 2023-07-04 RX ADMIN — ONDANSETRON 4 MG: 2 INJECTION INTRAMUSCULAR; INTRAVENOUS at 14:00

## 2023-07-04 RX ADMIN — LORAZEPAM 1 MG: 1 TABLET ORAL at 13:41

## 2023-07-04 ASSESSMENT — ACTIVITIES OF DAILY LIVING (ADL)
ADLS_ACUITY_SCORE: 37
ADLS_ACUITY_SCORE: 38
ADLS_ACUITY_SCORE: 37
ADLS_ACUITY_SCORE: 35
ADLS_ACUITY_SCORE: 38
ADLS_ACUITY_SCORE: 37

## 2023-07-04 NOTE — PLAN OF CARE
Goal Outcome Evaluation:      Plan of Care Reviewed With: patient    Summary: presents with alcohol withdrawal. ENNIS= 0.23   DATE & TIME: 7/4/2023, [id number]    Cognitive Concerns/ Orientation : A & O x 3-4. Lethargic most of the shift. More awake this afternoon.   BEHAVIOR & AGGRESSION TOOL COLOR: Green   CIWA SCORE: 6, 8, 3 (tremors, nausea, anxiety). PO ativan given x 1   ABNL VS/O2: VSS on RA  MOBILITY: SBA assist GB   PAIN MANAGMENT: denies   DIET: Regular, poor appetite today   BOWEL/BLADDER: BS active x 4.   ABNL LAB/BG: bilirubin= 1.3, ENNIS= 0.23   DRAIN/DEVICES: PIV infusing NS @ 75 mL/hr   TELEMETRY RHYTHM: NA   SKIN: pale, intact. Psoriasis patches scattered all over.   TESTS/PROCEDURES: NA  D/C DAY/GOALS/PLACE: pending clinical progress. Plan to transfer to inpatient treatment.   OTHER IMPORTANT INFO: IV zofran given x 1 for increased nausea. strong tremors to UE bilaterally. Nicotine patch in place to R. Deltoid. CD and SW following.

## 2023-07-04 NOTE — PHARMACY-ADMISSION MEDICATION HISTORY
Pharmacy Intern Admission Medication History    Admission medication history is complete. The information provided in this note is only as accurate as the sources available at the time of the update.    Medication reconciliation/reorder completed by provider prior to medication history? No    Information Source(s): Patient and CareEverywhere/SureScripts via in-person    Pertinent Information: pt reports only medication use is triamcinolone cream PRN - pt states no additional current use of any other prescription, OTC, supplement, or other medications at this time    Changes made to PTA medication list:    Added: none    Deleted: none    Changed: none    Medication Affordability:   Not including over the counter (OTC) medications, was there a time in the past 3 months when you did not take your medications as prescribed because of cost?: No    Allergies reviewed with patient and updates made in EHR: yes    Medication History Completed By: Ronda Adamson 7/4/2023 1:27 PM    Prior to Admission medications    Medication Sig Last Dose Taking? Auth Provider Long Term End Date   triamcinolone (KENALOG) 0.1 % external cream Apply sparingly to affected area three times daily as needed  at PRN Yes Reid Garcia MD     guaiFENesin (ROBITUSSIN) 20 mg/mL liquid Take 10 mLs by mouth every 4 hours as needed for cough  Patient not taking: Reported on 7/4/2023 Not Taking  Ruma Corley MD     melatonin 5 MG tablet Take 1 tablet (5 mg) by mouth every evening as needed for sleep  Patient not taking: Reported on 7/4/2023 Not Taking  Ruma Corley MD     multivitamin w/minerals (THERA-VIT-M) tablet Take 1 tablet by mouth daily  Patient not taking: Reported on 7/4/2023 Not Taking  Moises Link MD     thiamine (B-1) 100 MG tablet Take 1 tablet (100 mg) by mouth daily  Patient not taking: Reported on 7/4/2023 Not Taking  Moises Link MD

## 2023-07-04 NOTE — ED NOTES
Bed: ED04  Expected date:   Expected time:   Means of arrival:   Comments:  HEMS 446 42M etoh, anxiety, nausea eta 0523

## 2023-07-04 NOTE — H&P
[facility]    History and Physical - Hospitalist Service       Date of Admission:  7/4/2023    Assessment & Plan      Jonh Ritter is a 42 year old male admitted on 7/4/2023. He presents with alcohol withdrawal    Note: pt lethargic from meds given by EMS, unable to obtain much history    Alcohol withdrawal  Alcohol use disorder  Last Etoh at 0400, now tremulous, anxious. N/v. Plans for inpatient treatment in 2 days. Given droperidol/ benadryl by medics. History limited from patient in ED 2/2 sedation/ confusion from meds. In ED tachy 110s, other VSS. Labs normal exc bili at 1.3. Etoh 0.23.   - CIWA   - prn valium  - IV/PO vitamins  - SW re: direct transfer to inpatient treatment if able    Elevated bilirubin   Bili at 1.3 on presentation, improved from previous 2.6 2/2023  - monitor per routine       Diet:   regular  DVT Prophylaxis: Pneumatic Compression Devices  Mao Catheter: Not present  Lines: None     Cardiac Monitoring: None  Code Status:   Full by default    Clinically Significant Risk Factors Present on Admission                                Disposition Plan           Lamin Araya MD  Hospitalist Service  [facility]  Securely message with [employer] FFT (more info)  Text page via [employer] Paging/Directory     ______________________________________________________________________    Chief Complaint   Alcohol withdrawal    History is obtained from the electronic health record and emergency department physician    History of Present Illness   Jonh Ritter is a 42 year old male who presents with alcohol intoxication and withdrawal.  Note patient was given droperidol and Benadryl under to the hospital as he was very somnolent and history is quite limited. Patient has a history of alcohol use disorder, He reports he has been drinking for 9 years.  He reports he is drinking three quarters of a liter a day.  He currently denies any pain or nausea.  He has no  chest pain.  Further history is limited by his encephalopathy.      Past Medical History    Past Medical History:   Diagnosis Date     Alcohol abuse      CARDIOVASCULAR SCREENING; LDL GOAL LESS THAN 160      Psoriasis        Past Surgical History   Past Surgical History:   Procedure Laterality Date     SURGICAL HISTORY OF -   1987    Rt Inguinal Hernia Repair       Prior to Admission Medications   Prior to Admission Medications   Prescriptions Last Dose Informant Patient Reported? Taking?   guaiFENesin (ROBITUSSIN) 20 mg/mL liquid   No No   Sig: Take 10 mLs by mouth every 4 hours as needed for cough   melatonin 5 MG tablet   No No   Sig: Take 1 tablet (5 mg) by mouth every evening as needed for sleep   multivitamin w/minerals (THERA-VIT-M) tablet   No No   Sig: Take 1 tablet by mouth daily   thiamine (B-1) 100 MG tablet   No No   Sig: Take 1 tablet (100 mg) by mouth daily   triamcinolone (KENALOG) 0.1 % external cream   No No   Sig: Apply sparingly to affected area three times daily as needed      Facility-Administered Medications: None        Review of Systems    The 10 point Review of Systems is limited 2/2 patient factors    Physical Exam   Vital Signs: Temp: 97.9  F (36.6  C) Temp src: Oral BP: 130/85 Pulse: 109     SpO2: 99 %      Weight: 0 lbs 0 oz    General Appearance: Somnolent, lethargic, mildly confused  Respiratory: CTA B  Cardiovascular: tachy, no edema  GI: soft, nt/nd  Skin: no rashes or lesions grossly    Other: CN grossly intact, TEAGUE      Medical Decision Making       50 MINUTES SPENT BY ME on the date of service doing chart review, history, exam, documentation & further activities per the note.      Data     I have personally reviewed the following data over the past 24 hrs:    7.7  \   16.6   / 255     144 101 4.0 (L) /  102 (H)   3.4 28 0.90 \       ALT: 25 AST: 32 AP: 83 TBILI: 1.3 (H)   ALB: 4.7 TOT PROTEIN: 7.4 LIPASE: N/A       Imaging results reviewed over the past 24 hrs:   No results found  for this or any previous visit (from the past 24 hour(s)).

## 2023-07-04 NOTE — PROGRESS NOTES
Admission    Patient arrives to room 623-01 via cart from ER.  Care plan note: Lethargic. Responding to yes/no questions. VSS on RA. Denies pain. Unable to complete admission documentation at this time. Will re-assess once patient is more awake     Inpatient nursing criteria listed below were met:    Did you put disposition on whiteboard and in sticky note: Yes  Full skin assessment done (add LDA if skin issue present). Initials of 2nd RN :No-sleeping, will assess once patient is awake.   Isolation education started/completed DANIELA  Patient allergies verified with patient: No  Fall Risk? (Care plan updated, Education given and documented) Yes  Primary Care Plan initiated: Yes  Home medications documented in belongings flowsheet: No  Patient belongings documented in belongings flowsheet: No  Reminder note (belongings/ medications) placed in discharge instructions:No  Admission profile/ required documentation complete: No  If patient is a 72 hour hold/Commitment are belongings removed from room and locked up? DANIELA Jerez RN on 7/4/2023 at 10:05 AM

## 2023-07-04 NOTE — ED TRIAGE NOTES
Pt biba from home. Normally drinks half a liter of vodka daily, last drink an hour pta. Pt called 911 saying he thought he was going to die. Denies si/hi, but pd found guns in the house. Pt has appointment on Thursday for inpatient rehab at [place]. Pt was given 7.5mg droperidol ivp and 50mg bendaryl ivp for nausea, vomiting and severe anxiety. Fingerstick 146. PBT 0.19

## 2023-07-04 NOTE — ED NOTES
[facility]  ED Nurse Handoff Report    ED Chief complaint: Alcohol Intoxication      ED Diagnosis:   Final diagnoses:   Alcohol withdrawal syndrome with complication (H)       Code Status: Full Code    Allergies:   Allergies   Allergen Reactions    Penicillins      As infant       Patient Story: pt with hx of etoh abuse (drinks half a liter of vodka daily, last drink hour before arrival in ED) - pt called 911 stating he felt like he was going to die. Upon ems arrival, pt having nausea, vomiting and severe anxiety. Pt given 7.5mg droperidol and 50mg benadryl ivp en route. Pt has appointment on Thursday for inpatient rehab at [place]. PD noted gun around the house but patient denies suicidality.   Focused Assessment:  a&o 4, tachypneic and anxious, c/o nausea. Denies headache, but feels shaky. Denies chest or abdominal pain.     Treatments and/or interventions provided:   Results for orders placed or performed during the hospital encounter of 07/04/23   Comprehensive metabolic panel     Status: Abnormal   Result Value Ref Range    Sodium 144 136 - 145 mmol/L    Potassium 3.4 3.4 - 5.3 mmol/L    Chloride 101 98 - 107 mmol/L    Carbon Dioxide (CO2) 28 22 - 29 mmol/L    Anion Gap 15 7 - 15 mmol/L    Urea Nitrogen 4.0 (L) 6.0 - 20.0 mg/dL    Creatinine 0.90 0.67 - 1.17 mg/dL    Calcium 9.3 8.6 - 10.0 mg/dL    Glucose 102 (H) 70 - 99 mg/dL    Alkaline Phosphatase 83 40 - 129 U/L    AST 32 0 - 45 U/L    ALT 25 0 - 70 U/L    Protein Total 7.4 6.4 - 8.3 g/dL    Albumin 4.7 3.5 - 5.2 g/dL    Bilirubin Total 1.3 (H) <=1.2 mg/dL    GFR Estimate >90 >60 mL/min/1.73m2   Alcohol level blood     Status: Abnormal   Result Value Ref Range    Alcohol ethyl 0.23 (H) <=0.01 g/dL   CBC with platelets and differential     Status: Abnormal   Result Value Ref Range    WBC Count 7.7 4.0 - 11.0 10e3/uL    RBC Count 4.71 4.40 - 5.90 10e6/uL    Hemoglobin 16.6 13.3 - 17.7 g/dL    Hematocrit 47.4 40.0 - 53.0 %     (H) 78  - 100 fL    MCH 35.2 (H) 26.5 - 33.0 pg    MCHC 35.0 31.5 - 36.5 g/dL    RDW 12.3 10.0 - 15.0 %    Platelet Count 255 150 - 450 10e3/uL    % Neutrophils 74 %    % Lymphocytes 14 %    % Monocytes 10 %    % Eosinophils 1 %    % Basophils 1 %    % Immature Granulocytes 0 %    NRBCs per 100 WBC 0 <1 /100    Absolute Neutrophils 5.7 1.6 - 8.3 10e3/uL    Absolute Lymphocytes 1.1 0.8 - 5.3 10e3/uL    Absolute Monocytes 0.7 0.0 - 1.3 10e3/uL    Absolute Eosinophils 0.1 0.0 - 0.7 10e3/uL    Absolute Basophils 0.1 0.0 - 0.2 10e3/uL    Absolute Immature Granulocytes 0.0 <=0.4 10e3/uL    Absolute NRBCs 0.0 10e3/uL   [place] Draw     Status: None (In process)    Narrative    The following orders were created for panel order [place] Draw.  Procedure                               Abnormality         Status                     ---------                               -----------         ------                     Extra Blue Top Tube[[id number]]                              In process                 Extra Red Top Tube[[id number]]                               In process                   Please view results for these tests on the individual orders.   Magnesium     Status: Normal   Result Value Ref Range    Magnesium 2.2 1.7 - 2.3 mg/dL   CBC with platelets + differential     Status: Abnormal    Narrative    The following orders were created for panel order CBC with platelets + differential.  Procedure                               Abnormality         Status                     ---------                               -----------         ------                     CBC with platelets and d...[[id number]]  Abnormal            Final result                 Please view results for these tests on the individual orders.       Patient's response to treatments and/or interventions: fair    To be done/followed up on inpatient unit:  monitor for withdrawal    Does this patient have any cognitive concerns?: none    Activity level - Baseline/Home:   Independent  Activity Level - Current:   Stand with Assist    Patient's Preferred language: English   Needed?: No    Isolation: None  Infection: Not Applicable  Patient tested for COVID 19 prior to admission: NO  Bariatric?: No    Vital Signs:   Vitals:    07/04/23 0553 07/04/23 0556   BP: 130/85    Pulse: 109    Temp:  97.9  F (36.6  C)   TempSrc:  Oral   SpO2: 99%        Cardiac Rhythm:     Was the PSS-3 completed:   Yes  What interventions are required if any?               Family Comments: n/a  OBS brochure/video discussed/provided to patient/family: N/A              Name of person given brochure if not patient: n/a              Relationship to patient: n/a    For the majority of the shift this patient's behavior was Green.   Behavioral interventions performed were reassurance and information.    ED NURSE PHONE NUMBER: **[id number]

## 2023-07-04 NOTE — PROVIDER NOTIFICATION
MD Notification    Notified Person: MD    Notified Person Name: Eva     Notification Date/Time: 7/4/2023, 1220     Notification Interaction: [employer] messaging     Purpose of Notification: Hi, patient was just requesting a nicotine patch when you have time. Thanks!     Orders Received:    Comments:

## 2023-07-04 NOTE — PROGRESS NOTES
[facility]  Not billing    Medicine Progress Note - Hospitalist Service    Date of Admission:  7/4/2023    Assessment & Plan     This is a 42-year-old male with medical history which includes alcohol withdrawal was presented to the hospital by EMS with chief concerns of alcohol intoxication and withdrawal.  Patient mentioned that he drinks a half a liter of vodka daily but on Friday he drank more and he was very tremulous at home and EMS was called and EMS gave patient Benadryl and droperidol and on presentation to the hospital patient was very somnolent.  He had lab work done on admission including comprehensive metabolic panel which was remarkable for total bilirubin of 1.3 with normal ALT AST and alkaline phosphatase.  His CBC was unremarkable apart from MCV of 101    Alcohol intoxication  Alcohol withdrawal  -On exam patient does have tremulousness present and he was started on banana bag and we will continue patient with CIWA scale with alcohol withdrawal protocol, thiamine, multivitamin, folic acid  -We will continue to monitor him for signs of withdrawal  -Chemical dependency consult    Tobacco use  -Nicotine patch has been ordered    Elevated MCV  -His hemoglobin is stable at 16.6 but MCV is 101 and can be due to alcohol use    Elevated total bilirubin likely due to alcohol use  -His AST, ALT and alkaline phosphatase are normal and total bilirubin is elevated at 1.3 and can be due to alcohol use       Diet: Combination Diet Regular Diet Adult    DVT Prophylaxis: Pneumatic Compression Devices  Mao Catheter: Not present  Lines: None     Cardiac Monitoring: None  Code Status: Full Code      Clinically Significant Risk Factors Present on Admission                                Disposition Plan      Expected Discharge Date: 07/06/2023                  Anais Velasquez MD  Hospitalist Service  [facility]  Securely message with [employer]oc[employer] (more info)  Text page via  [employer] Paging/Directory   ______________________________________________________________________    Interval History     I saw the patient today and he was waking up from sleep.  Patient mentioned that he has been drinking half a liter of vodka daily but on Friday he drank more.  He mentioned that he was very anxious and tremulous and that is why 911 was called.  He does mention that he has been in outpatient treatment facility in the past and was planning to go to inpatient at his return on 7/5.  He was pretty shaky when I saw him.  I did tell the patient that CIWA scale has been ordered and he understands    Discussed plan of care with patient's nurse  Physical Exam   Vital Signs: Temp: 98.5  F (36.9  C) Temp src: Axillary BP: 112/72 Pulse: 82     SpO2: 98 % O2 Device: None (Room air)    Weight: 0 lbs 0 oz    General: AO x2-3  Respiratory: Patient was on room air and able to speak in full sentences  CVS: Regular rate  Abdomen: Nondistended, nontender  Psychiatric: Cooperative    Medical Decision Making             Data     I have personally reviewed the following data over the past 24 hrs:    7.7  \   16.6   / 255     144 101 4.0 (L) /  102 (H)   3.4 28 0.90 \       ALT: 25 AST: 32 AP: 83 TBILI: 1.3 (H)   ALB: 4.7 TOT PROTEIN: 7.4 LIPASE: N/A       Imaging results reviewed over the past 24 hrs:   No results found for this or any previous visit (from the past 24 hour(s)).

## 2023-07-04 NOTE — ED PROVIDER NOTES
History     Chief Complaint:  Alcohol Intoxication       The history is provided by the patient.      Jonh Ritter is a 42 year old male with history of alcohol abuse and alcohol withdrawal syndrome who presents to the ED via EMS alone for evaluation of alcohol intoxication. Patient reports his last drink was at 0400 this morning and now endorses intermittent nausea without vomiting, tremulous, and anxious. He notes he plans to admit to impatient treatment for the first time at [facility] for alcohol dependence in 2 days, but feels that he needs to be admitted for withdrawal before then. He has withdrawn before but never took medications for withdrawal symptoms. He denies vomiting, diaphoresis, hallucinations, light sensitivity, headache, numbness, or paraesthesia.    Independent Historian:   None - Patient Only    Review of External Notes:   Patient was administered 7.5 mg droperidol IVP and 50 mg benadryl IVP in EMS.    Medications:    The patient is not currently taking any prescribed medications.     Past Medical History:    Alcohol abuse  Alcohol withdrawal syndrome  Psoriasis    Past Surgical History:    Hernia repair    Physical Exam     Patient Vitals for the past 24 hrs:   BP Temp Temp src Pulse SpO2   07/04/23 0556 -- 97.9  F (36.6  C) Oral -- --   07/04/23 0553 130/85 -- -- 109 99 %        Physical Exam  General:  Sitting on bed.  Patient notably tremulous.  HENT:  No obvious trauma to head  Right Ear:  External ear normal.   Left Ear:  External ear normal.   Nose:  Nose normal.   Eyes:  Conjunctivae and EOM are normal. Pupils are equal, round, and reactive.   Neck: Normal range of motion. Neck supple. No tracheal deviation present.   CV:  Normal heart sounds. No murmur heard.  Pulm/Chest: Effort normal and breath sounds normal.   Abd: Soft. No distension. There is no tenderness. There is no rigidity, no rebound and no guarding.   M/S: Normal range of motion.   Neuro: Awake and alert.  Skin:  Skin is warm and dry. No rash noted. Very slight diaphoretic.   Psych: Normal mood and affect. Behavior is normal. Patient is tremulous and anxious.    Emergency Department Course     Laboratory:  Labs Ordered and Resulted from Time of ED Arrival to Time of ED Departure   COMPREHENSIVE METABOLIC PANEL - Abnormal       Result Value    Sodium 144      Potassium 3.4      Chloride 101      Carbon Dioxide (CO2) 28      Anion Gap 15      Urea Nitrogen 4.0 (*)     Creatinine 0.90      Calcium 9.3      Glucose 102 (*)     Alkaline Phosphatase 83      AST 32      ALT 25      Protein Total 7.4      Albumin 4.7      Bilirubin Total 1.3 (*)     GFR Estimate >90     ETHYL ALCOHOL LEVEL - Abnormal    Alcohol ethyl 0.23 (*)    CBC WITH PLATELETS AND DIFFERENTIAL - Abnormal    WBC Count 7.7      RBC Count 4.71      Hemoglobin 16.6      Hematocrit 47.4       (*)     MCH 35.2 (*)     MCHC 35.0      RDW 12.3      Platelet Count 255      % Neutrophils 74      % Lymphocytes 14      % Monocytes 10      % Eosinophils 1      % Basophils 1      % Immature Granulocytes 0      NRBCs per 100 WBC 0      Absolute Neutrophils 5.7      Absolute Lymphocytes 1.1      Absolute Monocytes 0.7      Absolute Eosinophils 0.1      Absolute Basophils 0.1      Absolute Immature Granulocytes 0.0      Absolute NRBCs 0.0     MAGNESIUM - Normal    Magnesium 2.2       Emergency Department Course & Assessments:    Interventions:  Medications   sodium chloride 0.9 % 1,000 mL with Infuvite Adult 10 mL, thiamine 100 mg, folic acid 1 mg infusion (has no administration in time range)   0.9% sodium chloride BOLUS (1,000 mLs Intravenous $New Bag 7/4/23 0600)   LORazepam (ATIVAN) injection 1 mg (1 mg Intravenous $Given 7/4/23 0638)     Assessments:  0629 I obtained history and examined the patient as noted above. We discussed plans for admission and the patient is comfortable with this plan.    Independent Interpretation (X-rays, CTs, rhythm  strip):  None    Consultations/Discussion of Management or Tests:  0641 I spoke with Dr. Araya of the hospitalist team regarding the patient, who accepted the patient for admission.     Social Determinants of Health affecting care:   None    Disposition:  The patient was admitted to the hospital under the care of Dr. Araya.     Impression & Plan    Medical Decision Making:  Jonh Ritter is a very pleasant 42 year old year old patient who presents to the emergency department with concern of nausea, tremors, and concern for alcohol withdrawal.  The patient admits to chronic alcohol use.  He has been through outpatient treatment before.  He is scheduled to check in for inpatient treatment in 2 days.  He is feeling uncomfortable and requesting assistance with acute withdrawal symptoms prior to checking in for treatment.  The patient has a CIWA score of 23 at this time despite alcohol level 0.23.  Given his high CIWA score, he was provided Ativan.  The patient was extremely anxious for paramedics and received droperidol and Benadryl prior to my evaluation.  The patient was more calm, but still very anxious and required the IV benzodiazepines.  He denies any other ingestion.  He has a benign, nonsurgical abdomen.  No tenderness to suggest pancreatitis and lipase is normal. Denies trauma. No SI. I spoke to the hospitalist, , who has agreed to admit the patient for continued evaluation and treatment.    Diagnosis:    ICD-10-CM    1. Alcohol withdrawal syndrome with complication (H)  F10.939            Discharge Medications:  New Prescriptions    No medications on file          Scribe Disclosure:  Lizandro GALLAGHER Hailie, am serving as a scribe at 6:42 AM on 7/4/2023 to document services personally performed by Dk Guzman DO based on my observations and the provider's statements to me.   7/4/2023   Dk Guzman DO Anderson, Robert James, DO  07/04/23 0684

## 2023-07-05 ENCOUNTER — PATIENT OUTREACH (OUTPATIENT)
Dept: FAMILY MEDICINE | Facility: CLINIC | Age: 43
End: 2023-07-05
Payer: COMMERCIAL

## 2023-07-05 NOTE — PROVIDER NOTIFICATION
Patient is asking to be discharge. patient admitted with alcohol withdrawal, still having BUE tremors and anxiety. Patient requesting to talk to the [facility] regarding this.

## 2023-07-05 NOTE — DISCHARGE SUMMARY
[facility]    Discharge Summary  Hospitalist    Date of Admission:  7/4/2023  Date of Discharge:  7/4/2023  Discharging Provider: Nadine Sharp PA-C      Discharge Diagnoses   Alcohol abuse disorder  Acute alcohol withdrawal     Hospital Course   This is a 42-year-old male with medical history which includes alcohol withdrawal was presented to the hospital by EMS with chief concerns of alcohol intoxication and withdrawal.  Patient mentioned that he drinks a half a liter of vodka daily but on Friday he drank more and he was very tremulous at home and EMS was called and EMS gave patient Benadryl and droperidol and on presentation to the hospital patient was very somnolent.  He had lab work done on admission including comprehensive metabolic panel which was remarkable for total bilirubin of 1.3 with normal ALT AST and alkaline phosphatase.  His CBC was unremarkable apart from MCV of 101     He will be discharged as he needs to go home to pack to be able to admit himself to [facility] for alcohol dependence tomorrow. He has someone that can provide a ride.    Alcohol intoxication  Alcohol withdrawal  -On exam patient does have tremulousness present and he was started on banana bag and we will continue patient with CIWA scale with alcohol withdrawal protocol, thiamine, multivitamin, folic acid - encouraged to continue vitamins on discharge  -We will continue to monitor him for signs of withdrawal  -Chemical dependency consulted     Tobacco use  -Nicotine patch has been ordered     Elevated MCV  -His hemoglobin is stable at 16.6 but MCV is 101 and can be due to alcohol use     Elevated total bilirubin likely due to alcohol use  -His AST, ALT and alkaline phosphatase are normal and total bilirubin is elevated at 1.3 and can be due to alcohol use    Nadine Sharp PA-C    Significant Results and Procedures   N/A    Pending Results   These results will be followed up by PCP  Unresulted Labs  Ordered in the Past 30 Days of this Admission     No orders found from 6/4/2023 to 7/5/2023.          Code Status   Full code       Primary Care Physician   Enmanuel Sandoval      Discharge Disposition   Discharged to home  Condition at discharge: Stable    Consultations This Hospital Stay   CARE MANAGEMENT / SOCIAL WORK IP CONSULT  CHEMICAL DEPENDENCY IP CONSULT    Time Spent on this Encounter   >30 minutes    Discharge Orders      Reason for your hospital stay    Alcohol withdrawal - patient planning for inpatient rehab admission tomorrow, 7/5.     Follow-up and recommended labs and tests     Follow-up with your PCP for medical needs.     Activity    Your activity upon discharge: activity as tolerated, no driving.     Diet    Follow this diet upon discharge: regular diet     Discharge Medications   Current Discharge Medication List      CONTINUE these medications which have NOT CHANGED    Details   triamcinolone (KENALOG) 0.1 % external cream Apply sparingly to affected area three times daily as needed  Qty: 240 g, Refills: 3    Associated Diagnoses: Psoriasis      melatonin 5 MG tablet Take 1 tablet (5 mg) by mouth every evening as needed for sleep    Associated Diagnoses: Alcohol abuse      multivitamin w/minerals (THERA-VIT-M) tablet Take 1 tablet by mouth daily  Qty: 30 tablet, Refills: 0    Associated Diagnoses: Alcohol abuse      thiamine (B-1) 100 MG tablet Take 1 tablet (100 mg) by mouth daily  Qty: 30 tablet, Refills: 0    Associated Diagnoses: Alcohol abuse         STOP taking these medications       guaiFENesin (ROBITUSSIN) 20 mg/mL liquid Comments:   Reason for Stopping:             Allergies   Allergies   Allergen Reactions     Penicillins      As infant

## 2023-07-05 NOTE — PLAN OF CARE
Goal Outcome Evaluation:      Plan of Care Reviewed With: patient    Overall Patient Progress: improvingOverall Patient Progress: improving         Patient discharging home. Discharge instruction reviewed to patient. Teach back done and patient able to teach back. Patient took all her belongings and his discharge instructions papers. Patient transporting home with uber.

## 2023-07-05 NOTE — TELEPHONE ENCOUNTER
"Called patient.     No questions or concerns.    Minor withdrawal symptoms- present  States mentally challenging at night when would be using heavily.     He will be going inpatient on Friday.     Advised continue not to drink and encouraged he can do this and congrats on taking first steps     Advised to call if worsening withdrawal symptoms, or if he has questions or concerns.     ED/Discharge Protocol    \"Hi, my name is Raissa Sunshine RN, a registered nurse, and I am calling on behalf of Dr. Sandoval's office at [place].  I am calling to follow up and see how things are going for you after your recent visit.\"    \"I see that you were in the (ER) on 7/4/23  How are you doing now that you are home?\" good minor withdrawal symptoms-he states manageable.     Is patient experiencing symptoms that may require a hospital visit?  No.     Discharge Instructions    \"Let's review your discharge instructions.  What is/are the follow-up recommendations?  Pt. Response: as needed     \"Were you instructed to make a follow-up appointment?\"  Pt. Response: No.       \"When you see the provider, I would recommend that you bring your discharge instructions with you.    Medications    \"How many new medications are you on since your hospitalization/ED visit?\"    0-1  \"How many of your current medicines changed (dose, timing, name, etc.) while you were in the hospital/ED visit?\"   0-1  \"Do you have questions about your medications?\"   No  \"Were you newly diagnosed with heart failure, COPD, diabetes or did you have a heart attack?\"   No  For patients on insulin: \"Did you start on insulin in the hospital or did you have your insulin dose changed?\"   No  Post Discharge Medication Reconciliation Status: discharge medications reconciled, continue medications without change.  Was MTM referral placed (*Make sure to put transitions as reason for referral)?   No    Call Summary    \"Do you have any questions or concerns about your condition or " "care plan at the moment?\"    No  Triage nurse advice given: advised to call triage line if withdrawal symptoms would worsen/not management  or has questions or concerns.     Patient was in 6 times in the ER  in the past year (assess appropriateness of ER visits.)      \"If you have questions or things don't continue to improve, we encourage you contact us through the main clinic number [phone]  .  Even if the clinic is not open, triage nurses are available 24/7 to help you.     We would like you to know that our clinic has extended hours (provide information).  We also have urgent care (provide details on closest location and hours/contact info)\"      \"Thank you for your time and take care!\"    Raissa GARCIA RN   [facility]         "

## 2023-07-05 NOTE — PROGRESS NOTES
Discharge    Patient discharged to home via transport with uber  Care plan note completed    Listed belongings gathered and given to patient (including from security/pharmacy). Yes  Care Plan and Patient education resolved: Yes  Prescriptions if needed, hard copies sent with patient  NA  Medication Bin checked and emptied on discharge Yes  SW/care coordinator/charge RN aware of discharge: Yes

## 2023-07-05 NOTE — TELEPHONE ENCOUNTER
Discharge Orders          Reason for your hospital stay     Alcohol withdrawal - patient planning for inpatient rehab admission tomorrow, 7/5.          Follow-up and recommended labs and tests      Follow-up with your PCP for medical needs.          Activity     Your activity upon discharge: activity as tolerated, no driving.          Diet     Follow this diet upon discharge: regular diet

## 2023-08-07 ENCOUNTER — HOSPITAL ENCOUNTER (EMERGENCY)
Facility: CLINIC | Age: 43
Discharge: PSYCHIATRIC OR SUBSTANCE USE FACILITY | End: 2023-08-08
Attending: EMERGENCY MEDICINE | Admitting: EMERGENCY MEDICINE
Payer: COMMERCIAL

## 2023-08-07 DIAGNOSIS — F10.20 SEVERE ALCOHOL USE DISORDER (H): ICD-10-CM

## 2023-08-07 DIAGNOSIS — R45.851 SUICIDAL IDEATION: ICD-10-CM

## 2023-08-07 LAB
ALBUMIN SERPL BCG-MCNC: 4.7 G/DL (ref 3.5–5.2)
ALCOHOL BREATH TEST: 0.05 (ref 0–0.01)
ALP SERPL-CCNC: 82 U/L (ref 40–129)
ALT SERPL W P-5'-P-CCNC: 19 U/L (ref 0–70)
ANION GAP SERPL CALCULATED.3IONS-SCNC: 14 MMOL/L (ref 7–15)
AST SERPL W P-5'-P-CCNC: 29 U/L (ref 0–45)
BASOPHILS # BLD AUTO: 0.1 10E3/UL (ref 0–0.2)
BASOPHILS NFR BLD AUTO: 1 %
BILIRUB SERPL-MCNC: 0.8 MG/DL
BUN SERPL-MCNC: 6.7 MG/DL (ref 6–20)
CALCIUM SERPL-MCNC: 9.7 MG/DL (ref 8.6–10)
CHLORIDE SERPL-SCNC: 99 MMOL/L (ref 98–107)
CREAT SERPL-MCNC: 0.9 MG/DL (ref 0.67–1.17)
DEPRECATED HCO3 PLAS-SCNC: 28 MMOL/L (ref 22–29)
EOSINOPHIL # BLD AUTO: 0.1 10E3/UL (ref 0–0.7)
EOSINOPHIL NFR BLD AUTO: 2 %
ERYTHROCYTE [DISTWIDTH] IN BLOOD BY AUTOMATED COUNT: 11.7 % (ref 10–15)
ETHANOL SERPL-MCNC: 0.15 G/DL
GFR SERPL CREATININE-BSD FRML MDRD: >90 ML/MIN/1.73M2
GLUCOSE SERPL-MCNC: 95 MG/DL (ref 70–99)
HCT VFR BLD AUTO: 45.1 % (ref 40–53)
HGB BLD-MCNC: 16 G/DL (ref 13.3–17.7)
HOLD SPECIMEN: NORMAL
HOLD SPECIMEN: NORMAL
IMM GRANULOCYTES # BLD: 0 10E3/UL
IMM GRANULOCYTES NFR BLD: 0 %
LYMPHOCYTES # BLD AUTO: 1.6 10E3/UL (ref 0.8–5.3)
LYMPHOCYTES NFR BLD AUTO: 24 %
MCH RBC QN AUTO: 34.9 PG (ref 26.5–33)
MCHC RBC AUTO-ENTMCNC: 35.5 G/DL (ref 31.5–36.5)
MCV RBC AUTO: 99 FL (ref 78–100)
MONOCYTES # BLD AUTO: 0.6 10E3/UL (ref 0–1.3)
MONOCYTES NFR BLD AUTO: 9 %
NEUTROPHILS # BLD AUTO: 4.1 10E3/UL (ref 1.6–8.3)
NEUTROPHILS NFR BLD AUTO: 64 %
NRBC # BLD AUTO: 0 10E3/UL
NRBC BLD AUTO-RTO: 0 /100
PLATELET # BLD AUTO: 284 10E3/UL (ref 150–450)
POTASSIUM SERPL-SCNC: 3.8 MMOL/L (ref 3.4–5.3)
PROT SERPL-MCNC: 7.5 G/DL (ref 6.4–8.3)
RBC # BLD AUTO: 4.58 10E6/UL (ref 4.4–5.9)
SODIUM SERPL-SCNC: 141 MMOL/L (ref 136–145)
WBC # BLD AUTO: 6.4 10E3/UL (ref 4–11)

## 2023-08-07 PROCEDURE — 99284 EMERGENCY DEPT VISIT MOD MDM: CPT | Performed by: EMERGENCY MEDICINE

## 2023-08-07 PROCEDURE — 82077 ASSAY SPEC XCP UR&BREATH IA: CPT | Performed by: EMERGENCY MEDICINE

## 2023-08-07 PROCEDURE — 83735 ASSAY OF MAGNESIUM: CPT | Performed by: EMERGENCY MEDICINE

## 2023-08-07 PROCEDURE — 99283 EMERGENCY DEPT VISIT LOW MDM: CPT | Mod: 25 | Performed by: EMERGENCY MEDICINE

## 2023-08-07 PROCEDURE — 84100 ASSAY OF PHOSPHORUS: CPT | Performed by: EMERGENCY MEDICINE

## 2023-08-07 PROCEDURE — 85025 COMPLETE CBC W/AUTO DIFF WBC: CPT | Performed by: EMERGENCY MEDICINE

## 2023-08-07 PROCEDURE — 82075 ASSAY OF BREATH ETHANOL: CPT | Performed by: EMERGENCY MEDICINE

## 2023-08-07 PROCEDURE — 258N000003 HC RX IP 258 OP 636: Performed by: EMERGENCY MEDICINE

## 2023-08-07 PROCEDURE — 80053 COMPREHEN METABOLIC PANEL: CPT | Performed by: EMERGENCY MEDICINE

## 2023-08-07 PROCEDURE — 96361 HYDRATE IV INFUSION ADD-ON: CPT | Performed by: EMERGENCY MEDICINE

## 2023-08-07 PROCEDURE — 96360 HYDRATION IV INFUSION INIT: CPT | Performed by: EMERGENCY MEDICINE

## 2023-08-07 PROCEDURE — 36415 COLL VENOUS BLD VENIPUNCTURE: CPT | Performed by: EMERGENCY MEDICINE

## 2023-08-07 RX ORDER — SODIUM CHLORIDE, SODIUM LACTATE, POTASSIUM CHLORIDE, CALCIUM CHLORIDE 600; 310; 30; 20 MG/100ML; MG/100ML; MG/100ML; MG/100ML
1000 INJECTION, SOLUTION INTRAVENOUS CONTINUOUS
Status: DISCONTINUED | OUTPATIENT
Start: 2023-08-07 | End: 2023-08-08

## 2023-08-07 RX ADMIN — SODIUM CHLORIDE, POTASSIUM CHLORIDE, SODIUM LACTATE AND CALCIUM CHLORIDE 1000 ML: 600; 310; 30; 20 INJECTION, SOLUTION INTRAVENOUS at 23:55

## 2023-08-07 RX ADMIN — SODIUM CHLORIDE, POTASSIUM CHLORIDE, SODIUM LACTATE AND CALCIUM CHLORIDE 1000 ML: 600; 310; 30; 20 INJECTION, SOLUTION INTRAVENOUS at 21:20

## 2023-08-07 ASSESSMENT — ENCOUNTER SYMPTOMS
HEMATOLOGIC/LYMPHATIC NEGATIVE: 1
NEUROLOGICAL NEGATIVE: 1
CARDIOVASCULAR NEGATIVE: 1
RESPIRATORY NEGATIVE: 1
MUSCULOSKELETAL NEGATIVE: 1
ALLERGIC/IMMUNOLOGIC NEGATIVE: 1
EYES NEGATIVE: 1
CONSTITUTIONAL NEGATIVE: 1
ENDOCRINE NEGATIVE: 1
GASTROINTESTINAL NEGATIVE: 1

## 2023-08-07 ASSESSMENT — ACTIVITIES OF DAILY LIVING (ADL)
ADLS_ACUITY_SCORE: 35
ADLS_ACUITY_SCORE: 35

## 2023-08-08 VITALS
TEMPERATURE: 97.8 F | RESPIRATION RATE: 16 BRPM | OXYGEN SATURATION: 96 % | DIASTOLIC BLOOD PRESSURE: 64 MMHG | SYSTOLIC BLOOD PRESSURE: 105 MMHG | HEART RATE: 75 BPM

## 2023-08-08 LAB
MAGNESIUM SERPL-MCNC: 2.1 MG/DL (ref 1.7–2.3)
PHOSPHATE SERPL-MCNC: 4.6 MG/DL (ref 2.5–4.5)

## 2023-08-08 PROCEDURE — 250N000013 HC RX MED GY IP 250 OP 250 PS 637: Performed by: EMERGENCY MEDICINE

## 2023-08-08 PROCEDURE — 258N000003 HC RX IP 258 OP 636: Performed by: EMERGENCY MEDICINE

## 2023-08-08 PROCEDURE — 250N000011 HC RX IP 250 OP 636: Performed by: EMERGENCY MEDICINE

## 2023-08-08 RX ORDER — CLONIDINE HYDROCHLORIDE 0.1 MG/1
0.1 TABLET ORAL EVERY 8 HOURS
Status: DISCONTINUED | OUTPATIENT
Start: 2023-08-08 | End: 2023-08-08 | Stop reason: HOSPADM

## 2023-08-08 RX ORDER — ONDANSETRON 4 MG/1
4 TABLET, ORALLY DISINTEGRATING ORAL ONCE
Status: COMPLETED | OUTPATIENT
Start: 2023-08-08 | End: 2023-08-08

## 2023-08-08 RX ORDER — LORAZEPAM 1 MG/1
1 TABLET ORAL
Status: DISCONTINUED | OUTPATIENT
Start: 2023-08-08 | End: 2023-08-08

## 2023-08-08 RX ORDER — FOLIC ACID 1 MG/1
1 TABLET ORAL DAILY
Status: DISCONTINUED | OUTPATIENT
Start: 2023-08-08 | End: 2023-08-08 | Stop reason: HOSPADM

## 2023-08-08 RX ORDER — OLANZAPINE 5 MG/1
5-10 TABLET, ORALLY DISINTEGRATING ORAL EVERY 6 HOURS PRN
Status: DISCONTINUED | OUTPATIENT
Start: 2023-08-08 | End: 2023-08-08 | Stop reason: HOSPADM

## 2023-08-08 RX ORDER — FLUMAZENIL 0.1 MG/ML
0.2 INJECTION, SOLUTION INTRAVENOUS
Status: DISCONTINUED | OUTPATIENT
Start: 2023-08-08 | End: 2023-08-08 | Stop reason: HOSPADM

## 2023-08-08 RX ORDER — LORAZEPAM 0.5 MG/1
0.5 TABLET ORAL
Status: COMPLETED | OUTPATIENT
Start: 2023-08-08 | End: 2023-08-08

## 2023-08-08 RX ORDER — MULTIPLE VITAMINS W/ MINERALS TAB 9MG-400MCG
1 TAB ORAL DAILY
Status: DISCONTINUED | OUTPATIENT
Start: 2023-08-08 | End: 2023-08-08 | Stop reason: HOSPADM

## 2023-08-08 RX ORDER — HALOPERIDOL 5 MG/ML
2.5-5 INJECTION INTRAMUSCULAR EVERY 6 HOURS PRN
Status: DISCONTINUED | OUTPATIENT
Start: 2023-08-08 | End: 2023-08-08 | Stop reason: HOSPADM

## 2023-08-08 RX ADMIN — ONDANSETRON 4 MG: 4 TABLET, ORALLY DISINTEGRATING ORAL at 01:34

## 2023-08-08 RX ADMIN — LORAZEPAM 0.5 MG: 0.5 TABLET ORAL at 03:29

## 2023-08-08 RX ADMIN — CLONIDINE HYDROCHLORIDE 0.1 MG: 0.1 TABLET ORAL at 01:34

## 2023-08-08 ASSESSMENT — ACTIVITIES OF DAILY LIVING (ADL)
ADLS_ACUITY_SCORE: 35

## 2023-08-08 ASSESSMENT — ENCOUNTER SYMPTOMS: AGITATION: 1

## 2023-08-08 NOTE — DISCHARGE INSTRUCTIONS
1) After period of care and observation you metabolize your alcohol and we have had a discussion about the statement you made to the care staff at [facility]. After discussion with the care team they are willing to have you return to continue your treatment.    2) We have reviewed that if you develop new symptoms of concern you should return to be re-evaluated

## 2023-08-08 NOTE — ED NOTES
8/7/2023  Jonh Ritter 1980     Writer consulted with ED staff on this date at 3:08 am. It was determined that pt would not benefit from assessment at this time due to MD no longer requesting DEC assessment     DEC order has been closed at this time.  Pt will return to [facility] facility to resume treatment     BENITO Ba

## 2023-08-08 NOTE — PROGRESS NOTES
8/7/2023  Jonh Ritter 1980     Writer consulted with ED provider, Yahir Mcallister, RN,  on this date at 20:47 PM. It was determined that pt would not benefit from assessment at this time due to Pt unable able to participate in assessment    ED will call DEC at [phone] when pt is ready and able to participate in assessment.     Familia Cook, KAROLINESW

## 2023-08-08 NOTE — ED TRIAGE NOTES
Pt presents from [facility], was admitted there today, and had an altercation with staff about being able to go out to smoke. Pt became agitated and stated that he might as well blow his head off. Pt states that this was said in the heat of the moment and he has no suicidal ideation.  Pt does not appear satisfied with [facility] and would like inpatient treatment.       Triage Assessment       Row Name 08/07/23 2001       Triage Assessment (Adult)    Airway WDL WDL       Respiratory WDL    Respiratory WDL WDL       Skin Circulation/Temperature WDL    Skin Circulation/Temperature WDL WDL       Cardiac WDL    Cardiac WDL WDL       Peripheral/Neurovascular WDL    Peripheral Neurovascular WDL WDL       Cognitive/Neuro/Behavioral WDL    Cognitive/Neuro/Behavioral WDL WDL

## 2023-08-08 NOTE — ED NOTES
DEC reports pt unable to complete assessment at this time, call DEC back when pt is more alert and they will reassess.

## 2023-08-08 NOTE — ED PROVIDER NOTES
History     Chief Complaint   Patient presents with    Mental Health Problem     HPI  Jonh Ritter is a 42 year old male who presents for evaluation after making a suicidal statement while at [facility].  Patient was admitted for alcohol use disorder today prior to ED arrival when he got into an argument with the staff about the ability to go outside and smoke and expressed that he was going to blow his head off.    Patient's prescribed medications were reviewed    On examination patient reports that he works as a  for [employer].  He is originally from [place], [place].  Patient admits that he drinks vodka every day and drinks about a liter per day.  His last drink was this morning.  He is sleepy but arouses to verbal commands.  He is unable to provide details of how he got here and why he was sent here.  Patient reports he did not use any other substances and that his only struggles with alcohol.    Patient did awake after metabolizing his alcohol and reported that this was a second treatment at [facility].  He was in a 28-day program and discharged in August-3rd, 2023.    Allergies:  Allergies   Allergen Reactions    Penicillins      As infant       Problem List:    Patient Active Problem List    Diagnosis Date Noted    Alcohol withdrawal syndrome with complication (H) 07/04/2023     Priority: Medium    Alcohol withdrawal syndrome without complication (H) 02/10/2023     Priority: Medium    Alcohol dependence with unspecified alcohol-induced disorder (H) 02/10/2023     Priority: Medium    Alcohol abuse 05/29/2022     Priority: Medium    CARDIOVASCULAR SCREENING; LDL GOAL LESS THAN 160      Priority: Medium        Past Medical History:    Past Medical History:   Diagnosis Date    Alcohol abuse     CARDIOVASCULAR SCREENING; LDL GOAL LESS THAN 160     Psoriasis        Past Surgical History:    Past Surgical History:   Procedure Laterality Date    SURGICAL HISTORY OF -   1987    Rt  "Inguinal Hernia Repair       Family History:    Family History   Problem Relation Age of Onset    Family History Negative Other        Social History:  Marital Status:  Single [1]  Social History     Tobacco Use    Smoking status: Every Day     Packs/day: 1.00     Types: Cigarettes    Smokeless tobacco: Never   Substance Use Topics    Alcohol use: Yes     Alcohol/week: 7.0 standard drinks of alcohol     Types: 7 Standard drinks or equivalent per week    Drug use: No        Medications:    melatonin 5 MG tablet  multivitamin w/minerals (THERA-VIT-M) tablet  thiamine (B-1) 100 MG tablet  triamcinolone (KENALOG) 0.1 % external cream          Review of Systems   Constitutional: Negative.    HENT: Negative.     Eyes: Negative.    Respiratory: Negative.     Cardiovascular: Negative.    Gastrointestinal: Negative.    Endocrine: Negative.    Genitourinary: Negative.    Musculoskeletal: Negative.    Skin: Negative.    Allergic/Immunologic: Negative.    Neurological: Negative.    Hematological: Negative.    Psychiatric/Behavioral:  Positive for agitation (\"I felt trapped when they told me i could not go out to smoke\").    All other systems reviewed and are negative.      Physical Exam   BP: 138/78  Pulse: 79  SpO2: 97 %      Physical Exam  Constitutional:       General: He is not in acute distress.     Appearance: Normal appearance. He is not toxic-appearing or diaphoretic.   HENT:      Head: Normocephalic and atraumatic.      Nose: Nose normal.   Eyes:      Extraocular Movements: Extraocular movements intact.      Pupils: Pupils are equal, round, and reactive to light.   Neck:      Vascular: No carotid bruit.   Cardiovascular:      Rate and Rhythm: Normal rate and regular rhythm.      Pulses: Normal pulses.   Pulmonary:      Effort: Pulmonary effort is normal. No respiratory distress.      Breath sounds: Normal breath sounds. No stridor. No wheezing, rhonchi or rales.   Chest:      Chest wall: No tenderness. "   Musculoskeletal:         General: No swelling, tenderness, deformity or signs of injury.      Cervical back: Normal range of motion. No rigidity or tenderness.      Right lower leg: No edema.      Left lower leg: No edema.   Skin:     Capillary Refill: Capillary refill takes less than 2 seconds.      Coloration: Skin is not jaundiced or pale.      Findings: No bruising, erythema, lesion or rash.   Neurological:      General: No focal deficit present.      Mental Status: He is alert and oriented to person, place, and time.      Cranial Nerves: No cranial nerve deficit.      Sensory: No sensory deficit.      Motor: No weakness.      Coordination: Coordination normal.      Gait: Gait normal.      Deep Tendon Reflexes: Reflexes normal.   Psychiatric:         Mood and Affect: Mood normal.         Behavior: Behavior normal.         Thought Content: Thought content normal.         Judgment: Judgment normal.         ED Course             Procedures              Critical Care time:  none.            ED medications:  Medications   lactated ringers infusion (1,000 mLs Intravenous $New Bag 8/7/23 4291)   cloNIDine (CATAPRES) tablet 0.1 mg (0.1 mg Oral $Given 8/8/23 9272)   OLANZapine zydis (zyPREXA) ODT tab 5-10 mg (has no administration in time range)     Or   haloperidol lactate (HALDOL) injection 2.5-5 mg (has no administration in time range)   flumazenil (ROMAZICON) injection 0.2 mg (has no administration in time range)   melatonin tablet 5 mg (has no administration in time range)   thiamine (B-1) tablet 100 mg (has no administration in time range)   folic acid (FOLVITE) tablet 1 mg (has no administration in time range)   multivitamin w/minerals (THERA-VIT-M) tablet 1 tablet (has no administration in time range)   LORazepam (ATIVAN) tablet 0.5 mg (has no administration in time range)   lactated ringers BOLUS 1,000 mL (0 mLs Intravenous Stopped 8/7/23 8847)   ondansetron (ZOFRAN ODT) ODT tab 4 mg (4 mg Oral $Given 8/8/23  0134)         ED Vitals:  Vitals:    08/07/23 2300 08/08/23 0136 08/08/23 0137 08/08/23 0138   BP: 122/68      Pulse: 79      SpO2:  97% 97% 96%      ED labs and imaging:  Results for orders placed or performed during the hospital encounter of 08/07/23   [place] Draw     Status: None    Narrative    The following orders were created for panel order [place] Draw.  Procedure                               Abnormality         Status                     ---------                               -----------         ------                     Extra Blue Top Tube[[id number]]                              Final result               Extra Red Top Tube[[id number]]                               Final result               Extra Green Top (Lithium...[[id number]]                                                 Extra Purple Top Tube[[id number]]                                                         Please view results for these tests on the individual orders.   Comprehensive metabolic panel     Status: Normal   Result Value Ref Range    Sodium 141 136 - 145 mmol/L    Potassium 3.8 3.4 - 5.3 mmol/L    Chloride 99 98 - 107 mmol/L    Carbon Dioxide (CO2) 28 22 - 29 mmol/L    Anion Gap 14 7 - 15 mmol/L    Urea Nitrogen 6.7 6.0 - 20.0 mg/dL    Creatinine 0.90 0.67 - 1.17 mg/dL    Calcium 9.7 8.6 - 10.0 mg/dL    Glucose 95 70 - 99 mg/dL    Alkaline Phosphatase 82 40 - 129 U/L    AST 29 0 - 45 U/L    ALT 19 0 - 70 U/L    Protein Total 7.5 6.4 - 8.3 g/dL    Albumin 4.7 3.5 - 5.2 g/dL    Bilirubin Total 0.8 <=1.2 mg/dL    GFR Estimate >90 >60 mL/min/1.73m2   Alcohol level blood     Status: Abnormal   Result Value Ref Range    Alcohol ethyl 0.15 (H) <=0.01 g/dL   Extra Blue Top Tube     Status: None   Result Value Ref Range    Hold Specimen JIC    Extra Red Top Tube     Status: None   Result Value Ref Range    Hold Specimen JIC    CBC with platelets and differential     Status: Abnormal   Result Value Ref Range    WBC Count 6.4 4.0 - 11.0 10e3/uL  "   RBC Count 4.58 4.40 - 5.90 10e6/uL    Hemoglobin 16.0 13.3 - 17.7 g/dL    Hematocrit 45.1 40.0 - 53.0 %    MCV 99 78 - 100 fL    MCH 34.9 (H) 26.5 - 33.0 pg    MCHC 35.5 31.5 - 36.5 g/dL    RDW 11.7 10.0 - 15.0 %    Platelet Count 284 150 - 450 10e3/uL    % Neutrophils 64 %    % Lymphocytes 24 %    % Monocytes 9 %    % Eosinophils 2 %    % Basophils 1 %    % Immature Granulocytes 0 %    NRBCs per 100 WBC 0 <1 /100    Absolute Neutrophils 4.1 1.6 - 8.3 10e3/uL    Absolute Lymphocytes 1.6 0.8 - 5.3 10e3/uL    Absolute Monocytes 0.6 0.0 - 1.3 10e3/uL    Absolute Eosinophils 0.1 0.0 - 0.7 10e3/uL    Absolute Basophils 0.1 0.0 - 0.2 10e3/uL    Absolute Immature Granulocytes 0.0 <=0.4 10e3/uL    Absolute NRBCs 0.0 10e3/uL   Magnesium     Status: Normal   Result Value Ref Range    Magnesium 2.1 1.7 - 2.3 mg/dL   Phosphorus     Status: Abnormal   Result Value Ref Range    Phosphorus 4.6 (H) 2.5 - 4.5 mg/dL   Alcohol Breath Test     Status: Abnormal   Result Value Ref Range    Alcohol Breath Test 0.05 (A) 0.00 - 0.01   CBC with platelets differential     Status: Abnormal    Narrative    The following orders were created for panel order CBC with platelets differential.  Procedure                               Abnormality         Status                     ---------                               -----------         ------                     CBC with platelets and d...[[id number]]  Abnormal            Final result                 Please view results for these tests on the individual orders.      Assessments & Plan (with Medical Decision Making)   Assessment Summary and Clinical impression: 42-year-old male who presented from [facility] after expressing that he would \"blow his head off\" because he could not go outside and smoke upon arriving for treatment at [facility]. Patient  was admitted there today for history of alcohol use disorder.  Patient admits that he drinks vodka daily.  He drinks about a liter " "per day and his last drink was today.  On my exam he was very sleepy but aroused to verbal commands.  GCS was 15.  He was allowed to metabolize his alcohol and a work-up was undertaken to ensure there was no other substances that would contribute to the degree of somnolence on initial arrival..  After period of care and observation patient metabolized his alcohol.  We discussed his statement prior to arrival and he expressed that he had made the statement out of frustration and \"feeling trapped\" because he was not allowed to go smoke.  After discussion with the care provider at [facility] they were willing to have the patient return in the morning help facilitate his intake assessment, behavior plan with plan to transition from detox to residential treatment. At shift end patient was awaiting return to [facility].    ED course and plan:  Reviewed the medical record.  Reviewed his evaluation on 7/4/2023.  He was given IV fluids and blood work was obtained.  No anion gap on arrival.  Blood alcohol was 0.15.  Normal liver enzymes.  Normal electrolytes.  Patient was offered therapies at his request as patient felt he was going to withdraw from alcohol.    Spoke with on-call provider at [facility]- KAVEH Connell NP at 2.55am we discussed their willingness to accept the patient back to resume and continue his treatment program.  Provider on duty reported that patient is currently in the detox and will be transitioning to residential within the next 24 hours where he will need to agree to a behavior plan and have assessment by their mental health care team.  After my discussion with team at [facility] I elected to cancel his DEC assessment by [facility] provider (which was pending) with the plan for the care team at [facility] to assist the patient upon his return to help facilitate his ongoing treatment.    At shift end at 6am patient signed out to Dr. MARI Vigil awaiting return to [facility] " [facility] for continued treatment for alcohol use disorder. Plan of care and handoff reviewed with patient.      Disclaimer: This note consists of symbols derived from keyboarding, dictation and/or voice recognition software. As a result, there may be errors in the script that have gone undetected. Please consider this when interpreting information found in this chart.   I have reviewed the nursing notes.    I have reviewed the findings, diagnosis, plan and need for follow up with the patient.           Medical Decision Making  The patient's presentation was of moderate complexity (an acute illness with systemic symptoms).    The patient's evaluation involved:  ordering and/or review of 2 test(s) in this encounter (diagnostic imaging and labs)    The patient's management necessitated moderate risk (prescription drug management including medications given in the ED).        New Prescriptions    No medications on file       Final diagnoses:   Suicidal ideation - Statement made to care staff at [facility] due to smoking restriction   Severe alcohol use disorder (H)       8/7/2023   [facility] EMERGENCY DEPT       Nato Lopez MD  08/08/23 0606

## 2024-04-12 DIAGNOSIS — L40.9 PSORIASIS: ICD-10-CM

## 2024-04-12 RX ORDER — TRIAMCINOLONE ACETONIDE 1 MG/G
CREAM TOPICAL
Qty: 240 G | Refills: 0 | Status: SHIPPED | OUTPATIENT
Start: 2024-04-12

## 2024-08-10 ENCOUNTER — HEALTH MAINTENANCE LETTER (OUTPATIENT)
Age: 44
End: 2024-08-10

## 2025-08-16 ENCOUNTER — HEALTH MAINTENANCE LETTER (OUTPATIENT)
Age: 45
End: 2025-08-16